# Patient Record
Sex: FEMALE | Race: WHITE | Employment: UNEMPLOYED | URBAN - METROPOLITAN AREA
[De-identification: names, ages, dates, MRNs, and addresses within clinical notes are randomized per-mention and may not be internally consistent; named-entity substitution may affect disease eponyms.]

---

## 2021-04-06 VITALS
BODY MASS INDEX: 24.58 KG/M2 | WEIGHT: 156.6 LBS | HEIGHT: 67 IN | SYSTOLIC BLOOD PRESSURE: 132 MMHG | DIASTOLIC BLOOD PRESSURE: 92 MMHG

## 2021-04-06 DIAGNOSIS — R19.5 FECAL OCCULT BLOOD TEST POSITIVE: ICD-10-CM

## 2021-04-06 PROBLEM — R51.9 HEADACHE: Status: ACTIVE | Noted: 2021-04-06

## 2021-04-06 PROBLEM — F32.A DEPRESSION: Status: ACTIVE | Noted: 2021-04-06

## 2021-04-06 PROBLEM — L03.211 CELLULITIS OF FACE: Status: ACTIVE | Noted: 2021-04-06

## 2021-04-06 PROBLEM — Z72.0 TOBACCO USE: Status: ACTIVE | Noted: 2021-04-06

## 2021-04-06 PROBLEM — I10 HTN (HYPERTENSION): Status: ACTIVE | Noted: 2021-04-06

## 2021-04-06 PROBLEM — F12.10 CANNABIS ABUSE: Status: ACTIVE | Noted: 2021-04-06

## 2021-04-06 PROBLEM — M19.90 OSTEOARTHRITIS: Status: ACTIVE | Noted: 2021-04-06

## 2021-04-06 PROBLEM — M75.52 BURSITIS OF SHOULDER, LEFT: Status: ACTIVE | Noted: 2021-04-06

## 2021-04-06 PROBLEM — F41.9 ANXIETY: Status: ACTIVE | Noted: 2021-04-06

## 2021-04-06 PROBLEM — E66.9 OBESITY: Status: ACTIVE | Noted: 2021-04-06

## 2021-04-06 PROBLEM — M06.9 RHEUMATOID ARTHRITIS (HCC): Status: ACTIVE | Noted: 2021-04-06

## 2021-04-06 PROBLEM — J40 BRONCHITIS: Chronic | Status: ACTIVE | Noted: 2021-04-06

## 2021-04-06 PROBLEM — M75.100 ROTATOR CUFF SYNDROME: Status: ACTIVE | Noted: 2021-04-06

## 2021-04-06 PROBLEM — R11.2 NAUSEA & VOMITING: Status: ACTIVE | Noted: 2021-04-06

## 2021-04-06 PROBLEM — R42 DIZZINESS AND GIDDINESS: Status: ACTIVE | Noted: 2021-04-06

## 2021-04-06 PROBLEM — K21.9 GERD (GASTROESOPHAGEAL REFLUX DISEASE): Status: ACTIVE | Noted: 2021-04-06

## 2021-04-06 PROBLEM — K21.9 ESOPHAGEAL REFLUX: Status: ACTIVE | Noted: 2021-04-06

## 2021-04-06 RX ORDER — LIDOCAINE HYDROCHLORIDE 20 MG/ML
15 SOLUTION OROPHARYNGEAL AS NEEDED
COMMUNITY

## 2021-04-06 RX ORDER — PROMETHAZINE HYDROCHLORIDE 25 MG/1
25 TABLET ORAL
COMMUNITY

## 2021-04-06 RX ORDER — FOLIC ACID 1 MG/1
1 TABLET ORAL DAILY
COMMUNITY

## 2021-04-06 RX ORDER — ATORVASTATIN CALCIUM 80 MG/1
80 TABLET, FILM COATED ORAL DAILY
COMMUNITY

## 2021-04-06 RX ORDER — FAMOTIDINE 20 MG/1
20 TABLET, FILM COATED ORAL 2 TIMES DAILY
COMMUNITY

## 2021-04-06 RX ORDER — ACYCLOVIR 200 MG/1
200 CAPSULE ORAL 2 TIMES DAILY
COMMUNITY

## 2021-04-06 RX ORDER — IBUPROFEN 600 MG/1
600 TABLET ORAL 2 TIMES DAILY
COMMUNITY

## 2021-04-06 RX ORDER — HYDROXYCHLOROQUINE SULFATE 200 MG/1
200 TABLET, FILM COATED ORAL 2 TIMES DAILY
COMMUNITY

## 2021-04-06 RX ORDER — ETODOLAC 400 MG/1
400 TABLET, FILM COATED ORAL 2 TIMES DAILY
COMMUNITY

## 2021-04-06 RX ORDER — MELATONIN
1000 DAILY
COMMUNITY

## 2021-04-06 RX ORDER — AMLODIPINE BESYLATE 2.5 MG/1
2.5 TABLET ORAL DAILY
COMMUNITY

## 2021-04-06 RX ORDER — DULOXETIN HYDROCHLORIDE 60 MG/1
60 CAPSULE, DELAYED RELEASE ORAL 2 TIMES DAILY
COMMUNITY

## 2021-04-06 RX ORDER — TRAZODONE HYDROCHLORIDE 50 MG/1
50 TABLET ORAL
COMMUNITY

## 2021-04-06 RX ORDER — SERTRALINE HYDROCHLORIDE 100 MG/1
100 TABLET, FILM COATED ORAL DAILY
COMMUNITY

## 2021-04-06 RX ORDER — METHOTREXATE 25 MG/ML
INJECTION INTRA-ARTERIAL; INTRAMUSCULAR; INTRATHECAL; INTRAVENOUS
COMMUNITY

## 2021-04-06 RX ORDER — OMEPRAZOLE 20 MG/1
CAPSULE, DELAYED RELEASE ORAL 2 TIMES DAILY
COMMUNITY

## 2021-04-06 RX ORDER — ALBUTEROL SULFATE 90 UG/1
AEROSOL, METERED RESPIRATORY (INHALATION)
COMMUNITY

## 2021-04-07 ENCOUNTER — OFFICE VISIT (OUTPATIENT)
Dept: GASTROENTEROLOGY | Age: 66
End: 2021-04-07
Payer: OTHER GOVERNMENT

## 2021-04-07 VITALS
SYSTOLIC BLOOD PRESSURE: 130 MMHG | HEART RATE: 103 BPM | HEIGHT: 67 IN | OXYGEN SATURATION: 97 % | BODY MASS INDEX: 26.06 KG/M2 | WEIGHT: 166 LBS | DIASTOLIC BLOOD PRESSURE: 80 MMHG | TEMPERATURE: 98.2 F | RESPIRATION RATE: 14 BRPM

## 2021-04-07 DIAGNOSIS — K62.5 GASTROINTESTINAL HEMORRHAGE ASSOCIATED WITH ANORECTAL SOURCE: ICD-10-CM

## 2021-04-07 DIAGNOSIS — R19.5 POSITIVE FIT (FECAL IMMUNOCHEMICAL TEST): Primary | ICD-10-CM

## 2021-04-07 DIAGNOSIS — Z86.010 HISTORY OF COLON POLYPS: ICD-10-CM

## 2021-04-07 DIAGNOSIS — R11.2 NON-INTRACTABLE VOMITING WITH NAUSEA, UNSPECIFIED VOMITING TYPE: ICD-10-CM

## 2021-04-07 DIAGNOSIS — K21.9 GASTROESOPHAGEAL REFLUX DISEASE, UNSPECIFIED WHETHER ESOPHAGITIS PRESENT: ICD-10-CM

## 2021-04-07 PROCEDURE — 99204 OFFICE O/P NEW MOD 45 MIN: CPT | Performed by: INTERNAL MEDICINE

## 2021-04-07 RX ORDER — POLYETHYLENE GLYCOL 3350 17 G/17G
POWDER, FOR SOLUTION ORAL
Qty: 510 G | Refills: 0 | Status: SHIPPED | OUTPATIENT
Start: 2021-04-07 | End: 2021-04-21

## 2021-04-07 NOTE — PROGRESS NOTES
1. Have you been to the ER, urgent care clinic since your last visit? Hospitalized since your last visit? NO    2. Have you seen or consulted any other health care providers outside of the [address] [place] [address] since your last visit? Include any pap smears or colon screening. NO. Chief Complaint   Patient presents with    Heme Positive Stool     Refered by Dept of [facility]    Vomiting     Visit Vitals  /80 (BP 1 Location: Left upper arm, BP Patient Position: Sitting, BP Cuff Size: Large adult)   Pulse (!) 103   Temp 98.2 °F (36.8 °C) (Skin)   Resp 14   Ht 5' 7\" (1.702 m)   Wt 75.3 kg (166 lb)   SpO2 97% Comment: room air   BMI 26.00 kg/m²     .

## 2021-04-07 NOTE — PROGRESS NOTES
EGD AND COLONOSCOPY ARE SCHEDULED FOR 4- AT 10:00 AM.  EGD AND COLONOSCOPY ARE APPROVED PER DEPT OF [facility]-REFERRAL # [id number] DATE 2-

## 2021-04-08 NOTE — H&P (VIEW-ONLY)
Geovani Glover is a 72 y.o. female who presents today for the following: Chief Complaint Patient presents with  Heme Positive Stool Refered by Dept of [facility]  GERD  Vomiting Allergies Allergen Reactions  Sulfa (Sulfonamide Antibiotics) Unknown (comments)  Sulfasalazine Unknown (comments) Current Outpatient Medications Medication Sig  polyethylene glycol (MIRALAX) 17 gram/dose powder Use as directed  Indications: emptying of the bowel  amLODIPine (NORVASC) 2.5 mg tablet Take 2.5 mg by mouth daily.  cholecalciferol (VITAMIN D3) (1000 Units /25 mcg) tablet Take 1,000 Units by mouth daily.  famotidine (PEPCID) 20 mg tablet Take 20 mg by mouth two (2) times a day.  ibuprofen (MOTRIN) 600 mg tablet Take 600 mg by mouth two (2) times a day. AS NEEDED  
 methotrexate, PF, 25 mg/mL injection every seven (7) days.  promethazine (PHENERGAN) 25 mg tablet Take 25 mg by mouth every six (6) hours as needed for Nausea.  sertraline (ZOLOFT) 100 mg tablet Take 100 mg by mouth daily. 1/2 TAB DAILY  traZODone (DESYREL) 50 mg tablet Take 50 mg by mouth nightly. 3 TAB Q HS  
 acyclovir (ZOVIRAX) 200 mg capsule Take 200 mg by mouth two (2) times a day. 2 CAP PO BID  atorvastatin (LIPITOR) 80 mg tablet Take 80 mg by mouth daily. 1/2 TAB QD  
 folic acid (FOLVITE) 1 mg tablet Take 1 mg by mouth daily. 3 TAB QD  
 albuterol (PROVENTIL HFA, VENTOLIN HFA, PROAIR HFA) 90 mcg/actuation inhaler Take  by inhalation every four (4) hours as needed for Wheezing.  etodolac (LODINE) 400 mg tablet Take 400 mg by mouth two (2) times a day. AS NEEDED  
 omeprazole (PRILOSEC) 20 mg capsule Take  by mouth two (2) times a day.  hydrOXYchloroQUINE (PLAQUENIL) 200 mg tablet Take 200 mg by mouth two (2) times a day.  DULoxetine (CYMBALTA) 60 mg capsule Take 60 mg by mouth two (2) times a day.   
 lidocaine (Lidocaine Viscous) 2 % solution Take 15 mL by mouth as needed for Pain. 5 TIME A DAY AS DIRECTED  
 adalimumab (HUMIRA) 40 mg/0.8 mL injection pen 40 mg by SubCUTAneous route Once every 2 weeks. No current facility-administered medications for this visit. Past Medical History:  
Diagnosis Date  Anxiety 4/6/2021  Bronchitis 4/6/2021  Bursitis of shoulder, left 4/6/2021  Cannabis abuse 4/6/2021  Cellulitis of face 4/6/2021  Depression 4/6/2021  Dizziness and giddiness 4/6/2021  Esophageal reflux 4/6/2021  Fatigue  Fecal occult blood test positive 4/6/2021  
 2018  GERD (gastroesophageal reflux disease) 4/6/2021  
 Headache 4/6/2021  
 HTN (hypertension) 4/6/2021  Obesity 4/6/2021  Osteoarthritis 4/6/2021  Rheumatoid arthritis ([facility] [address] 4/6/2021  Rotator cuff syndrome 4/6/2021  Tobacco use 4/6/2021 History reviewed. No pertinent surgical history. Family History Problem Relation Age of Onset  Heart Disease Father Social History Socioeconomic History  Marital status: UNKNOWN Spouse name: Not on file  Number of children: Not on file  Years of education: Not on file  Highest education level: Not on file Occupational History  Not on file Social Needs  Financial resource strain: Not on file  Food insecurity Worry: Not on file Inability: Not on file  Transportation needs Medical: Not on file Non-medical: Not on file Tobacco Use  Smoking status: Current Every Day Smoker  Smokeless tobacco: Never Used  Tobacco comment: smokes black and mild cigar Substance and Sexual Activity  Alcohol use: Yes Alcohol/week: 2.0 standard drinks Types: 2 Cans of beer per week  Drug use: Yes Types: Marijuana  Sexual activity: Not on file Lifestyle  Physical activity Days per week: Not on file Minutes per session: Not on file  Stress: Not on file Relationships  Social connections Talks on phone: Not on file   Gets together: Not on file Attends Buddhist service: Not on file Active member of club or organization: Not on file Attends meetings of clubs or organizations: Not on file Relationship status: Not on file  Intimate partner violence Fear of current or ex partner: Not on file Emotionally abused: Not on file Physically abused: Not on file Forced sexual activity: Not on file Other Topics Concern  Not on file Social History Narrative  Not on file HPI 
78-year-old female with history of hypertension, leukemia, gastroesophageal reflux disease, anxiety/depression, osteoarthritis, and rheumatoid arthritis who was referred from the [facility] secondary to FIT positive stools. Patient states 1-1/2 years ago she was found to have blood in her stools and scheduled for a colonoscopy. The test was not done at that time. She states because of flare with her leukemia. She states she has been treated for leukemi and that her levels are stable presently. She has been seeing blood in her stool which is bright red blood in the commode and on wiping at times. No abdominal pain. Her bowel movements are loose and multiple every morning. Poor appetite. Her weight has been stable recently. She has heartburn every day. She states she usually has nausea vomiting every few days. She does take omeprazole and famotidine. Her last colonoscopy was in 2000 and a couple polyps were removed according to patient. Review of Systems Constitutional: Negative. HENT: Negative. Negative for nosebleeds. Eyes: Negative. Respiratory: Negative. Cardiovascular: Negative. Gastrointestinal: Positive for blood in stool, diarrhea, heartburn, melena, nausea and vomiting. Negative for abdominal pain and constipation. Genitourinary: Negative. Musculoskeletal: Positive for joint pain. Skin: Negative. Neurological: Negative. Endo/Heme/Allergies: Negative.    
Psychiatric/Behavioral: Negative. All other systems reviewed and are negative. Visit Vitals /80 (BP 1 Location: Left upper arm, BP Patient Position: Sitting, BP Cuff Size: Large adult) Pulse (!) 103 Temp 98.2 °F (36.8 °C) (Skin) Resp 14 Ht 5' 7\" (1.702 m) Wt 75.3 kg (166 lb) SpO2 97% Comment: room air BMI 26.00 kg/m² Physical Exam 
Vitals signs and nursing note reviewed. Constitutional:   
   Appearance: Normal appearance. She is obese. HENT:  
   Head: Normocephalic and atraumatic. Nose: Nose normal.  
   Mouth/Throat:  
   Mouth: Mucous membranes are moist.  
   Pharynx: Oropharynx is clear. Eyes:  
   General: No scleral icterus. Conjunctiva/sclera: Conjunctivae normal.  
   Pupils: Pupils are equal, round, and reactive to light. Neck: Musculoskeletal: Normal range of motion and neck supple. Cardiovascular:  
   Rate and Rhythm: Normal rate and regular rhythm. Pulses: Normal pulses. Heart sounds: Normal heart sounds. Pulmonary:  
   Effort: Pulmonary effort is normal.  
   Breath sounds: Normal breath sounds. Abdominal:  
   General: Bowel sounds are normal. There is no distension. Palpations: Abdomen is soft. There is no mass. Tenderness: There is no abdominal tenderness. There is no right CVA tenderness, left CVA tenderness, guarding or rebound. Hernia: No hernia is present. Musculoskeletal: Normal range of motion. Skin: 
   General: Skin is warm and dry. Coloration: Skin is not jaundiced. Neurological:  
   General: No focal deficit present. Mental Status: She is alert and oriented to person, place, and time. Psychiatric:     
   Mood and Affect: Mood normal.     
   Behavior: Behavior normal.     
   Thought Content: Thought content normal.     
   Judgment: Judgment normal.  
 
  
 
 
1. Positive FIT (fecal immunochemical test) 
 
- COLONOSCOPY,DIAGNOSTIC; Future - polyethylene glycol (MIRALAX) 17 gram/dose powder; Use as directed  Indications: emptying of the bowel  Dispense: 510 g; Refill: 0 
- UPPER GI ENDOSCOPY,DIAGNOSIS; Future 2. Gastrointestinal hemorrhage associated with anorectal source 
 
- COLONOSCOPY,DIAGNOSTIC; Future - polyethylene glycol (MIRALAX) 17 gram/dose powder; Use as directed  Indications: emptying of the bowel  Dispense: 510 g; Refill: 0 
- UPPER GI ENDOSCOPY,DIAGNOSIS; Future 3. History of colon polyps 
 
- COLONOSCOPY,DIAGNOSTIC; Future - polyethylene glycol (MIRALAX) 17 gram/dose powder; Use as directed  Indications: emptying of the bowel  Dispense: 510 g; Refill: 0 
 
4. Non-intractable vomiting with nausea, unspecified vomiting type - UPPER GI ENDOSCOPY,DIAGNOSIS; Future 5. Gastroesophageal reflux disease, unspecified whether esophagitis present - UPPER GI ENDOSCOPY,DIAGNOSIS; Future

## 2021-04-08 NOTE — PROGRESS NOTES
Maricarmen Todd is a 72 y.o. female who presents today for the following:  Chief Complaint   Patient presents with    Heme Positive Stool     Refered by Dept of [facility]ER[facility]    Vomiting         Allergies   Allergen Reactions    Sulfa (Sulfonamide Antibiotics) Unknown (comments)    Sulfasalazine Unknown (comments)       Current Outpatient Medications   Medication Sig    polyethylene glycol (MIRALAX) 17 gram/dose powder Use as directed  Indications: emptying of the bowel    amLODIPine (NORVASC) 2.5 mg tablet Take 2.5 mg by mouth daily.  cholecalciferol (VITAMIN D3) (1000 Units /25 mcg) tablet Take 1,000 Units by mouth daily.  famotidine (PEPCID) 20 mg tablet Take 20 mg by mouth two (2) times a day.  ibuprofen (MOTRIN) 600 mg tablet Take 600 mg by mouth two (2) times a day. AS NEEDED    methotrexate, PF, 25 mg/mL injection every seven (7) days.  promethazine (PHENERGAN) 25 mg tablet Take 25 mg by mouth every six (6) hours as needed for Nausea.  sertraline (ZOLOFT) 100 mg tablet Take 100 mg by mouth daily. 1/2 TAB DAILY    traZODone (DESYREL) 50 mg tablet Take 50 mg by mouth nightly. 3 TAB Q HS    acyclovir (ZOVIRAX) 200 mg capsule Take 200 mg by mouth two (2) times a day. 2 CAP PO BID    atorvastatin (LIPITOR) 80 mg tablet Take 80 mg by mouth daily. 1/2 TAB QD    folic acid (FOLVITE) 1 mg tablet Take 1 mg by mouth daily. 3 TAB QD    albuterol (PROVENTIL HFA, VENTOLIN HFA, PROAIR HFA) 90 mcg/actuation inhaler Take  by inhalation every four (4) hours as needed for Wheezing.  etodolac (LODINE) 400 mg tablet Take 400 mg by mouth two (2) times a day. AS NEEDED    omeprazole (PRILOSEC) 20 mg capsule Take  by mouth two (2) times a day.  hydrOXYchloroQUINE (PLAQUENIL) 200 mg tablet Take 200 mg by mouth two (2) times a day.  DULoxetine (CYMBALTA) 60 mg capsule Take 60 mg by mouth two (2) times a day.     lidocaine (Lidocaine Viscous) 2 % solution Take 15 mL by mouth as needed for Pain. 5 TIME A DAY AS DIRECTED    adalimumab (HUMIRA) 40 mg/0.8 mL injection pen 40 mg by SubCUTAneous route Once every 2 weeks. No current facility-administered medications for this visit. Past Medical History:   Diagnosis Date    Anxiety 4/6/2021    Bronchitis 4/6/2021    Bursitis of shoulder, left 4/6/2021    Cannabis abuse 4/6/2021    Cellulitis of face 4/6/2021    Depression 4/6/2021    Dizziness and giddiness 4/6/2021    Esophageal reflux 4/6/2021    Fatigue     Fecal occult blood test positive 4/6/2021    2018    GERD (gastroesophageal reflux disease) 4/6/2021    Headache 4/6/2021    HTN (hypertension) 4/6/2021    Obesity 4/6/2021    Osteoarthritis 4/6/2021    Rheumatoid arthritis ([facility] [address] 4/6/2021    Rotator cuff syndrome 4/6/2021    Tobacco use 4/6/2021       History reviewed. No pertinent surgical history. Family History   Problem Relation Age of Onset    Heart Disease Father        Social History     Socioeconomic History    Marital status: UNKNOWN     Spouse name: Not on file    Number of children: Not on file    Years of education: Not on file    Highest education level: Not on file   Occupational History    Not on file   Social Needs    Financial resource strain: Not on file    Food insecurity     Worry: Not on file     Inability: Not on file    Transportation needs     Medical: Not on file     Non-medical: Not on file   Tobacco Use    Smoking status: Current Every Day Smoker    Smokeless tobacco: Never Used    Tobacco comment: smokes black and mild cigar   Substance and Sexual Activity    Alcohol use:  Yes     Alcohol/week: 2.0 standard drinks     Types: 2 Cans of beer per week    Drug use: Yes     Types: Marijuana    Sexual activity: Not on file   Lifestyle    Physical activity     Days per week: Not on file     Minutes per session: Not on file    Stress: Not on file   Relationships    Social connections     Talks on phone: Not on file     Gets together: Not on file     Attends [employer]ist service: Not on file     Active member of club or organization: Not on file     Attends meetings of clubs or organizations: Not on file     Relationship status: Not on file    Intimate partner violence     Fear of current or ex partner: Not on file     Emotionally abused: Not on file     Physically abused: Not on file     Forced sexual activity: Not on file   Other Topics Concern    Not on file   Social History Narrative    Not on file         HPI  61-year-old female with history of hypertension, leukemia, gastroesophageal reflux disease, anxiety/depression, osteoarthritis, and rheumatoid arthritis who was referred from the [facility] secondary to FIT positive stools. Patient states 1-1/2 years ago she was found to have blood in her stools and scheduled for a colonoscopy. The test was not done at that time. She states because of flare with her leukemia. She states she has been treated for leukemi and that her levels are stable presently. She has been seeing blood in her stool which is bright red blood in the commode and on wiping at times. No abdominal pain. Her bowel movements are loose and multiple every morning. Poor appetite. Her weight has been stable recently. She has heartburn every day. She states she usually has nausea vomiting every few days. She does take omeprazole and famotidine. Her last colonoscopy was in 2000 and a couple polyps were removed according to patient. Review of Systems   Constitutional: Negative. HENT: Negative. Negative for nosebleeds. Eyes: Negative. Respiratory: Negative. Cardiovascular: Negative. Gastrointestinal: Positive for blood in stool, diarrhea, heartburn, melena, nausea and vomiting. Negative for abdominal pain and constipation. Genitourinary: Negative. Musculoskeletal: Positive for joint pain. Skin: Negative. Neurological: Negative. Endo/Heme/Allergies: Negative.     Psychiatric/Behavioral: Negative. All other systems reviewed and are negative. Visit Vitals  /80 (BP 1 Location: Left upper arm, BP Patient Position: Sitting, BP Cuff Size: Large adult)   Pulse (!) 103   Temp 98.2 °F (36.8 °C) (Skin)   Resp 14   Ht 5' 7\" (1.702 m)   Wt 75.3 kg (166 lb)   SpO2 97% Comment: room air   BMI 26.00 kg/m²     Physical Exam  Vitals signs and nursing note reviewed. Constitutional:       Appearance: Normal appearance. She is obese. HENT:      Head: Normocephalic and atraumatic. Nose: Nose normal.      Mouth/Throat:      Mouth: Mucous membranes are moist.      Pharynx: Oropharynx is clear. Eyes:      General: No scleral icterus. Conjunctiva/sclera: Conjunctivae normal.      Pupils: Pupils are equal, round, and reactive to light. Neck:      Musculoskeletal: Normal range of motion and neck supple. Cardiovascular:      Rate and Rhythm: Normal rate and regular rhythm. Pulses: Normal pulses. Heart sounds: Normal heart sounds. Pulmonary:      Effort: Pulmonary effort is normal.      Breath sounds: Normal breath sounds. Abdominal:      General: Bowel sounds are normal. There is no distension. Palpations: Abdomen is soft. There is no mass. Tenderness: There is no abdominal tenderness. There is no right CVA tenderness, left CVA tenderness, guarding or rebound. Hernia: No hernia is present. Musculoskeletal: Normal range of motion. Skin:     General: Skin is warm and dry. Coloration: Skin is not jaundiced. Neurological:      General: No focal deficit present. Mental Status: She is alert and oriented to person, place, and time. Psychiatric:         Mood and Affect: Mood normal.         Behavior: Behavior normal.         Thought Content:  Thought content normal.         Judgment: Judgment normal.            1. Positive FIT (fecal immunochemical test)    - COLONOSCOPY,DIAGNOSTIC; Future  - polyethylene glycol (MIRALAX) 17 gram/dose powder; Use as directed  Indications: emptying of the bowel  Dispense: 510 g; Refill: 0  - UPPER GI ENDOSCOPY,DIAGNOSIS; Future    2. Gastrointestinal hemorrhage associated with anorectal source    - COLONOSCOPY,DIAGNOSTIC; Future  - polyethylene glycol (MIRALAX) 17 gram/dose powder; Use as directed  Indications: emptying of the bowel  Dispense: 510 g; Refill: 0  - UPPER GI ENDOSCOPY,DIAGNOSIS; Future    3. History of colon polyps    - COLONOSCOPY,DIAGNOSTIC; Future  - polyethylene glycol (MIRALAX) 17 gram/dose powder; Use as directed  Indications: emptying of the bowel  Dispense: 510 g; Refill: 0    4. Non-intractable vomiting with nausea, unspecified vomiting type    - UPPER GI ENDOSCOPY,DIAGNOSIS; Future    5.  Gastroesophageal reflux disease, unspecified whether esophagitis present    - UPPER GI ENDOSCOPY,DIAGNOSIS; Future

## 2021-04-15 ENCOUNTER — HOSPITAL ENCOUNTER (OUTPATIENT)
Dept: PREADMISSION TESTING | Age: 66
Discharge: HOME | End: 2021-04-15
Payer: OTHER GOVERNMENT

## 2021-04-15 LAB — SARS-COV-2, COV2: NORMAL

## 2021-04-15 PROCEDURE — U0003 INFECTIOUS AGENT DETECTION BY NUCLEIC ACID (DNA OR RNA); SEVERE ACUTE RESPIRATORY SYNDROME CORONAVIRUS 2 (SARS-COV-2) (CORONAVIRUS DISEASE [COVID-19]), AMPLIFIED PROBE TECHNIQUE, MAKING USE OF HIGH THROUGHPUT TECHNOLOGIES AS DESCRIBED BY CMS-2020-01-R: HCPCS

## 2021-04-16 LAB — SARS-COV-2, COV2NT: NOT DETECTED

## 2021-04-21 ENCOUNTER — HOSPITAL ENCOUNTER (OUTPATIENT)
Age: 66
Setting detail: OUTPATIENT SURGERY
Discharge: HOME | End: 2021-04-21
Attending: INTERNAL MEDICINE | Admitting: INTERNAL MEDICINE
Payer: OTHER GOVERNMENT

## 2021-04-21 ENCOUNTER — ANESTHESIA EVENT (OUTPATIENT)
Dept: ENDOSCOPY | Age: 66
End: 2021-04-21
Payer: OTHER GOVERNMENT

## 2021-04-21 ENCOUNTER — ANESTHESIA (OUTPATIENT)
Dept: ENDOSCOPY | Age: 66
End: 2021-04-21
Payer: OTHER GOVERNMENT

## 2021-04-21 VITALS
HEART RATE: 89 BPM | OXYGEN SATURATION: 97 % | TEMPERATURE: 97.7 F | DIASTOLIC BLOOD PRESSURE: 65 MMHG | SYSTOLIC BLOOD PRESSURE: 126 MMHG | BODY MASS INDEX: 26.82 KG/M2 | HEIGHT: 65 IN | WEIGHT: 161 LBS | RESPIRATION RATE: 20 BRPM

## 2021-04-21 PROCEDURE — 74011250636 HC RX REV CODE- 250/636: Performed by: NURSE ANESTHETIST, CERTIFIED REGISTERED

## 2021-04-21 PROCEDURE — 74011250636 HC RX REV CODE- 250/636: Performed by: INTERNAL MEDICINE

## 2021-04-21 PROCEDURE — 88305 TISSUE EXAM BY PATHOLOGIST: CPT

## 2021-04-21 PROCEDURE — 45380 COLONOSCOPY AND BIOPSY: CPT | Performed by: INTERNAL MEDICINE

## 2021-04-21 PROCEDURE — 74011000258 HC RX REV CODE- 258: Performed by: NURSE ANESTHETIST, CERTIFIED REGISTERED

## 2021-04-21 PROCEDURE — 43239 EGD BIOPSY SINGLE/MULTIPLE: CPT | Performed by: INTERNAL MEDICINE

## 2021-04-21 PROCEDURE — 77030021593 HC FCPS BIOP ENDOSC BSC -A: Performed by: INTERNAL MEDICINE

## 2021-04-21 PROCEDURE — 76040000007: Performed by: INTERNAL MEDICINE

## 2021-04-21 PROCEDURE — 2709999900 HC NON-CHARGEABLE SUPPLY: Performed by: INTERNAL MEDICINE

## 2021-04-21 PROCEDURE — 77030018831 HC SOL IRR H20 BAXT -A: Performed by: INTERNAL MEDICINE

## 2021-04-21 PROCEDURE — 74011000250 HC RX REV CODE- 250: Performed by: NURSE ANESTHETIST, CERTIFIED REGISTERED

## 2021-04-21 PROCEDURE — 76060000032 HC ANESTHESIA 0.5 TO 1 HR: Performed by: INTERNAL MEDICINE

## 2021-04-21 RX ORDER — SODIUM CHLORIDE 9 MG/ML
INJECTION, SOLUTION INTRAVENOUS
Status: DISCONTINUED | OUTPATIENT
Start: 2021-04-21 | End: 2021-04-21 | Stop reason: HOSPADM

## 2021-04-21 RX ORDER — SODIUM CHLORIDE 0.9 % (FLUSH) 0.9 %
5-40 SYRINGE (ML) INJECTION EVERY 8 HOURS
Status: DISCONTINUED | OUTPATIENT
Start: 2021-04-21 | End: 2021-04-21 | Stop reason: HOSPADM

## 2021-04-21 RX ORDER — SODIUM CHLORIDE 9 MG/ML
125 INJECTION, SOLUTION INTRAVENOUS CONTINUOUS
Status: DISCONTINUED | OUTPATIENT
Start: 2021-04-21 | End: 2021-04-21 | Stop reason: HOSPADM

## 2021-04-21 RX ORDER — SODIUM CHLORIDE 0.9 % (FLUSH) 0.9 %
5-40 SYRINGE (ML) INJECTION AS NEEDED
Status: DISCONTINUED | OUTPATIENT
Start: 2021-04-21 | End: 2021-04-21 | Stop reason: HOSPADM

## 2021-04-21 RX ORDER — KETAMINE HYDROCHLORIDE 10 MG/ML
INJECTION, SOLUTION INTRAMUSCULAR; INTRAVENOUS AS NEEDED
Status: DISCONTINUED | OUTPATIENT
Start: 2021-04-21 | End: 2021-04-21 | Stop reason: HOSPADM

## 2021-04-21 RX ORDER — PROPOFOL 10 MG/ML
INJECTION, EMULSION INTRAVENOUS AS NEEDED
Status: DISCONTINUED | OUTPATIENT
Start: 2021-04-21 | End: 2021-04-21 | Stop reason: HOSPADM

## 2021-04-21 RX ADMIN — PROPOFOL 100 MG: 10 INJECTION, EMULSION INTRAVENOUS at 11:55

## 2021-04-21 RX ADMIN — KETAMINE HYDROCHLORIDE 10 MG: 10 INJECTION, SOLUTION INTRAMUSCULAR; INTRAVENOUS at 11:37

## 2021-04-21 RX ADMIN — PROPOFOL 100 MG: 10 INJECTION, EMULSION INTRAVENOUS at 11:43

## 2021-04-21 RX ADMIN — PROPOFOL 100 MG: 10 INJECTION, EMULSION INTRAVENOUS at 11:51

## 2021-04-21 RX ADMIN — PROPOFOL 100 MG: 10 INJECTION, EMULSION INTRAVENOUS at 11:40

## 2021-04-21 RX ADMIN — PROPOFOL 100 MG: 10 INJECTION, EMULSION INTRAVENOUS at 11:37

## 2021-04-21 RX ADMIN — KETAMINE HYDROCHLORIDE 20 MG: 10 INJECTION, SOLUTION INTRAMUSCULAR; INTRAVENOUS at 11:30

## 2021-04-21 RX ADMIN — PROPOFOL 100 MG: 10 INJECTION, EMULSION INTRAVENOUS at 11:45

## 2021-04-21 RX ADMIN — SODIUM CHLORIDE 125 ML/HR: 9 INJECTION, SOLUTION INTRAVENOUS at 09:44

## 2021-04-21 RX ADMIN — SODIUM CHLORIDE: 9 INJECTION INTRAVENOUS at 11:20

## 2021-04-21 RX ADMIN — PROPOFOL 100 MG: 10 INJECTION, EMULSION INTRAVENOUS at 11:33

## 2021-04-21 RX ADMIN — PROPOFOL 100 MG: 10 INJECTION, EMULSION INTRAVENOUS at 11:30

## 2021-04-21 NOTE — DISCHARGE INSTRUCTIONS

## 2021-04-21 NOTE — OP NOTES
EGD Procedure Note        Patient: Rachel Saxena MRN: [id number]  SSN: [id number]    YOB: 1955  Age: 77 y.o. Sex: female        Date/Time:  4/21/2021 12:21 PM         IMPRESSION:       1. Generalized gastritis with erosions  2. Distal esophagitis (grade 2)  3. Decreased LES tone       RECOMMENDATIONS:    1. Check biopsy results. 2. Continue the omeprazole 20 mg daily. 3. Patient should stop or limit use of NSAIDs. Procedure: Esophagogastroduodenoscopy with cold biopsies    Indication: Positive FIT test, GI bleeding, nausea vomiting    Endoscopist:  Satinder Mckeon MD    Referring Provider:   Jorge Marmolejo MD    History: The history and physical exam were reviewed and updated. Endoscope: GIF H190 Olympus video endoscope    Extent of Exam: Second part of the duodenum    ASA: Grade 2    Anethesia/Sedation:  TIVA    Description of the procedure: The procedure was discussed with the patient including risks, benefits, alternatives including risks of iv sedation, bleeding, perforation and aspiration. A safety timeout was performed. The patient was placed in the left lateral decubitus position. A bite block was placed. The patient was using standard protocol. The patients vital signs were monitored at all times including heart rate/rhythm, blood pressure and oxygen saturation. The endoscope was then passed under direct visualization to the second part of the duodenum. The endoscope was then slowly withdrawn while visualizing the mucosa. In the stomach a retroflexion was performed and gastric fundus and cardia visualized. The patient was then transferred to recovery in stable condition. Findings:   Esophagus: The esophageal mucosa was inflamed throughout the distal esophagus consistent with a grade 2 esophagitis. .  Stomach: The gastric mucosa was flamed throughout the entire stomach with a large amount of erosions noted throughout the entire colon.   Multiple biopsies were taken in the gastric antrum. .   Duodenum: The duodenum mucosa was normal with no ulceration, mass, stricture and no evidence of villous atrophy. Therapies: None    Specimens:   ID Type Source Tests Collected by Time Destination   1 :  Preservative Stomach, Antrum  Salomón Ballard MD 4/21/2021 1132 Pathology   2 : rectal polyp Preservative Rectum  Salomón Ballard MD 4/21/2021 1205 Pathology              EBL: Minimal    Complications:   None; patient tolerated the procedure well.      Implants: None    Discharge disposition:  Out of the recovery area when discharge criteria met         Catalina Tadeo MD  April 21, 2021  12:21 PM

## 2021-04-21 NOTE — H&P
Patient: Mireya Humphries MRN: [id number]  SSN: [id number]    YOB: 1955  Age: 77 y.o. Sex: female      History and Physical    Mireya Humphries is a 77 y.o. female having Procedure(s):  EGD & COLONOSCOPY  (TIVA)  . Colten Rust Allergies: Allergies   Allergen Reactions    Sulfa (Sulfonamide Antibiotics) Unknown (comments)    Sulfasalazine Unknown (comments)        Chief Complaint: GI bleed, Gerd   History of Present Illness:    Past Medical History:   Diagnosis Date    Anxiety 4/6/2021    Bronchitis 4/6/2021    Bursitis of shoulder, left 4/6/2021    Cancer ([facility] [address]     Cannabis abuse 4/6/2021    Cellulitis of face 4/6/2021    Depression 4/6/2021    Dizziness and giddiness 4/6/2021    Esophageal reflux 4/6/2021    Fatigue     Fecal occult blood test positive 4/6/2021 2018    GERD (gastroesophageal reflux disease) 4/6/2021    Headache 4/6/2021    HTN (hypertension) 4/6/2021    Obesity 4/6/2021    Osteoarthritis 4/6/2021    Rheumatoid arthritis ([facility] [address] 4/6/2021    Rotator cuff syndrome 4/6/2021    Tobacco use 4/6/2021      Past Surgical History:   Procedure Laterality Date    HX APPENDECTOMY      HX CYST REMOVAL Left     HX HYSTERECTOMY        Family History   Problem Relation Age of Onset    Heart Disease Father     Psychiatric Disorder Mother       Social History     Tobacco Use    Smoking status: Current Every Day Smoker    Smokeless tobacco: Never Used    Tobacco comment: smokes black and mild cigar   Substance Use Topics    Alcohol use: Yes     Alcohol/week: 2.0 standard drinks     Types: 2 Cans of beer per week        Prior to Admission medications    Medication Sig Start Date End Date Taking? Authorizing Provider   polyethylene glycol (MIRALAX) 17 gram/dose powder Use as directed  Indications: emptying of the bowel 4/7/21  Yes Zaheer Burr MD   amLODIPine (NORVASC) 2.5 mg tablet Take 2.5 mg by mouth daily.    Yes Provider, Historical   cholecalciferol (VITAMIN D3) (1000 Units /25 mcg) tablet Take 1,000 Units by mouth daily. Yes Provider, Historical   famotidine (PEPCID) 20 mg tablet Take 20 mg by mouth two (2) times a day. Yes Provider, Historical   ibuprofen (MOTRIN) 600 mg tablet Take 600 mg by mouth two (2) times a day. AS NEEDED   Yes Provider, Historical   sertraline (ZOLOFT) 100 mg tablet Take 100 mg by mouth daily. 1/2 TAB DAILY   Yes Provider, Historical   traZODone (DESYREL) 50 mg tablet Take 50 mg by mouth nightly. 3 TAB Q HS   Yes Provider, Historical   acyclovir (ZOVIRAX) 200 mg capsule Take 200 mg by mouth two (2) times a day. 2 CAP PO BID   Yes Provider, Historical   atorvastatin (LIPITOR) 80 mg tablet Take 80 mg by mouth daily. 1/2 TAB QD   Yes Provider, Historical   folic acid (FOLVITE) 1 mg tablet Take 1 mg by mouth daily. 3 TAB QD   Yes Provider, Historical   omeprazole (PRILOSEC) 20 mg capsule Take  by mouth two (2) times a day. Yes Provider, Historical   hydrOXYchloroQUINE (PLAQUENIL) 200 mg tablet Take 200 mg by mouth two (2) times a day. Yes Provider, Historical   DULoxetine (CYMBALTA) 60 mg capsule Take 60 mg by mouth two (2) times a day. Yes Provider, Historical   methotrexate, PF, 25 mg/mL injection every seven (7) days. Provider, Historical   promethazine (PHENERGAN) 25 mg tablet Take 25 mg by mouth every six (6) hours as needed for Nausea. Provider, Historical   lidocaine (Lidocaine Viscous) 2 % solution Take 15 mL by mouth as needed for Pain. 5 TIME A DAY AS DIRECTED    Provider, Historical   albuterol (PROVENTIL HFA, VENTOLIN HFA, PROAIR HFA) 90 mcg/actuation inhaler Take  by inhalation every four (4) hours as needed for Wheezing. Provider, Historical   adalimumab (HUMIRA) 40 mg/0.8 mL injection pen 40 mg by SubCUTAneous route Once every 2 weeks. Provider, Historical   etodolac (LODINE) 400 mg tablet Take 400 mg by mouth two (2) times a day.  AS NEEDED    Provider, Historical        Visit Vitals  /79   Pulse 95   Temp 37 °C (98.6 °F)   Resp 20   Ht 5' 5\" (1.651 m)   Wt 73 kg (161 lb)   SpO2 97%   Breastfeeding No   BMI 26.79 kg/m²        Assessment and Plan:   Soha Merchant is a 77 y.o. female having Procedure(s):  EGD & COLONOSCOPY  (TIVA)  .     Preanesthesia Evaluation     Last edited 04/21/21 1051 by Pelon Carlin CRNA  Date of Service 04/21/21 1051  Status: Signed             Relevant Problems   No relevant active problems       Anesthetic History   No history of anesthetic complications  Other anesthesia complications          Review of Systems / Medical History  Patient summary reviewed, nursing notes reviewed and pertinent labs reviewed    Pulmonary  Within defined limits                 Neuro/Psych   Within defined limits           Cardiovascular  Within defined limits                     GI/Hepatic/Renal  Within defined limits              Endo/Other  Within defined limits           Other Findings              Physical Exam    Airway  Mallampati: II    Neck ROM: normal range of motion        Cardiovascular    Rhythm: regular  Rate: normal         Dental  No notable dental hx       Pulmonary  Breath sounds clear to auscultation               Abdominal  Abdominal exam normal       Other Findings            Anesthetic Plan    ASA: 3  Anesthesia type: total IV anesthesia            Anesthetic plan and risks discussed with: Patient               Preanesthesia evaluation performed by Pelon Carlin CRNA            Signed By: Barry Vidal CRNA     April 21, 2021

## 2021-04-21 NOTE — ANESTHESIA POSTPROCEDURE EVALUATION
Procedure(s):  EGD & COLONOSCOPY  (TIVA)  . .    total IV anesthesia    Anesthesia Post Evaluation      Multimodal analgesia: multimodal analgesia not used between 6 hours prior to anesthesia start to PACU discharge  Patient location during evaluation: PACU  Patient participation: complete - patient participated  Pain management: adequate  Anesthetic complications: no  Cardiovascular status: acceptable  Respiratory status: acceptable  Hydration status: acceptable  Post anesthesia nausea and vomiting:  none  Final Post Anesthesia Temperature Assessment:  Normothermia (36.0-37.5 degrees C)      INITIAL Post-op Vital signs:   Vitals Value Taken Time   /68 04/21/21 1225   Temp 36.5 °C (97.7 °F) 04/21/21 1225   Pulse 86 04/21/21 1225   Resp 18 04/21/21 1225   SpO2 100 % 04/21/21 1225

## 2021-04-21 NOTE — ANESTHESIA PREPROCEDURE EVALUATION
Relevant Problems   No relevant active problems       Anesthetic History   No history of anesthetic complications  Other anesthesia complications          Review of Systems / Medical History  Patient summary reviewed, nursing notes reviewed and pertinent labs reviewed    Pulmonary  Within defined limits                 Neuro/Psych   Within defined limits           Cardiovascular  Within defined limits                     GI/Hepatic/Renal  Within defined limits              Endo/Other  Within defined limits           Other Findings              Physical Exam    Airway  Mallampati: II    Neck ROM: normal range of motion        Cardiovascular    Rhythm: regular  Rate: normal         Dental  No notable dental hx       Pulmonary  Breath sounds clear to auscultation               Abdominal  Abdominal exam normal       Other Findings            Anesthetic Plan    ASA: 3  Anesthesia type: total IV anesthesia            Anesthetic plan and risks discussed with: Patient

## 2021-04-21 NOTE — OP NOTES
Colonoscopy Procedure Note      Patient: Acosta Zamorano MRN: [id number]  SSN: [id number]    YOB: 1955  Age: 77 y.o. Sex: female      Date of Procedure: 4/21/2021  Date/Time:  4/21/2021 12:14 PM       IMPRESSION:     1. Rectal polyp   2. Left-sided diverticulosis         RECOMMENDATIONS:     1) Check biopsy results. 2) Await pathology report. Call me in 2 weeks if you have not received any information from my office regarding your results. 3) Repeat colonoscopy in 2 to 3 years or as determined by the pathology report. INDICATION: Positive FIT test, GI bleeding     PROCEDURE PERFORMED: Colonoscopy with cold biopsies     DESCRIPTION OF PROCEDURE: An informed consent was obtained. The patient was placed in left lateral position. Perianal inspection and a digital rectal exam was performed. Video colonoscope was introduced into the rectum and advanced under direct vision up to the terminal ileum. With adequate insufflation and maneuvering of the withdrawing scope, the colonic mucosa was visualized carefully. Retroflexion was performed in the rectum to see the anorectum and also in the ascending colon to look behind the folds. Vital signs, pulse oximetry, single lead cardiac monitor were monitored throughout the procedure as the sedation was titrated to the desired effect ensuring patient comfort and safety. The patient tolerated the procedure very well and was transferred to the recovery area. Following is the summary of findings: We removed a small polyp measuring approximately 0.4 cm from the rectum by cold biopsies. Patient was noted to have left-sided diverticulosis which was moderate in number. The colon was somewhat stiffer as removed through that region. We did notice areas of increased erythema and submucosa edema throughout the right colon with a few submucosa areas of hemorrhage felt to be secondary to inflation of air in the right colon.      ENDOSCOPIST: Angelito Ann Starla De MD      ENDOSCOPE: Olympus video colonoscope     ASSISTANT:Circ-1: Iván Donaldson              Scrub Tech-1: Zbigniew Regalado     ANESTHESIA: TIVA      QUALITY OF PREPARATION: Fair      FINDINGS:   1. Rectal polyp  2.  Left-sided diverticulosis      EBL: Minimal   SPECIMENS:   ID Type Source Tests Collected by Time Destination   1 :  Preservative Stomach, Antrum  Beulah Licona MD 4/21/2021 1132 Pathology   2 : rectal polyp Preservative Rectum  Beulah Licona MD 4/21/2021 1205 Pathology             Blas Christine MD  April 21, 2021  12:14 PM

## 2021-04-21 NOTE — INTERVAL H&P NOTE
Update History & Physical 
 
The Patient's History and Physical of April 21, 2021 was reviewed with the patient and I examined the patient. There was no change. The surgical site was confirmed by the patient and me. Plan:  The risk, benefits, expected outcome, and alternative to the recommended procedure have been discussed with the patient. Patient understands and wants to proceed with the procedure.  
 
Electronically signed by Liz Dunne MD on 4/21/2021 at 9:39 AM

## 2021-05-04 NOTE — PROGRESS NOTES
Tell patient that the biopsy taken in her stomach showed gastritis/inflammation. No infection was noted. Polyp removed from his rectum was benign. He should have a repeat colonoscopy in 3 years.

## 2021-05-11 ENCOUNTER — TELEPHONE (OUTPATIENT)
Dept: GASTROENTEROLOGY | Age: 66
End: 2021-05-11

## 2021-08-03 PROBLEM — K21.9 ESOPHAGEAL REFLUX: Status: RESOLVED | Noted: 2021-04-06 | Resolved: 2021-08-03

## 2022-03-18 PROBLEM — E66.9 OBESITY: Status: ACTIVE | Noted: 2021-04-06

## 2022-03-18 PROBLEM — K21.9 GERD (GASTROESOPHAGEAL REFLUX DISEASE): Status: ACTIVE | Noted: 2021-04-06

## 2022-03-18 PROBLEM — R19.5 FECAL OCCULT BLOOD TEST POSITIVE: Status: ACTIVE | Noted: 2021-04-06

## 2022-03-18 PROBLEM — I10 HTN (HYPERTENSION): Status: ACTIVE | Noted: 2021-04-06

## 2022-03-18 PROBLEM — F32.A DEPRESSION: Status: ACTIVE | Noted: 2021-04-06

## 2022-03-18 PROBLEM — J40 BRONCHITIS: Status: ACTIVE | Noted: 2021-04-06

## 2022-03-19 PROBLEM — F41.9 ANXIETY: Status: ACTIVE | Noted: 2021-04-06

## 2022-03-19 PROBLEM — R42 DIZZINESS AND GIDDINESS: Status: ACTIVE | Noted: 2021-04-06

## 2022-03-19 PROBLEM — Z72.0 TOBACCO USE: Status: ACTIVE | Noted: 2021-04-06

## 2022-03-19 PROBLEM — F12.10 CANNABIS ABUSE: Status: ACTIVE | Noted: 2021-04-06

## 2022-03-19 PROBLEM — L03.211 CELLULITIS OF FACE: Status: ACTIVE | Noted: 2021-04-06

## 2022-03-19 PROBLEM — M06.9 RHEUMATOID ARTHRITIS (HCC): Status: ACTIVE | Noted: 2021-04-06

## 2022-03-19 PROBLEM — R51.9 HEADACHE: Status: ACTIVE | Noted: 2021-04-06

## 2022-03-19 PROBLEM — R11.2 NAUSEA & VOMITING: Status: ACTIVE | Noted: 2021-04-06

## 2022-03-19 PROBLEM — M75.100 ROTATOR CUFF SYNDROME: Status: ACTIVE | Noted: 2021-04-06

## 2022-03-19 PROBLEM — M19.90 OSTEOARTHRITIS: Status: ACTIVE | Noted: 2021-04-06

## 2022-03-19 PROBLEM — M75.52 BURSITIS OF SHOULDER, LEFT: Status: ACTIVE | Noted: 2021-04-06

## 2022-07-02 ENCOUNTER — HOSPITAL ENCOUNTER (EMERGENCY)
Age: 67
Discharge: LEFT BEFORE DISCHARGE | End: 2022-07-02

## 2022-07-02 VITALS
WEIGHT: 160.94 LBS | RESPIRATION RATE: 19 BRPM | OXYGEN SATURATION: 99 % | HEIGHT: 65 IN | DIASTOLIC BLOOD PRESSURE: 98 MMHG | TEMPERATURE: 98.8 F | HEART RATE: 98 BPM | BODY MASS INDEX: 26.81 KG/M2 | SYSTOLIC BLOOD PRESSURE: 107 MMHG

## 2022-07-03 NOTE — ED TRIAGE NOTES
Pt reports she had a tumor removed at the [facility]. Pt states she followed up with the surgeon and that told her the wound was infected. Pt states the surgeon was supposed to give her a \"wound kit\" but never did.

## 2023-05-24 RX ORDER — ACYCLOVIR 200 MG/1
200 CAPSULE ORAL 2 TIMES DAILY
COMMUNITY

## 2023-05-24 RX ORDER — ETODOLAC 400 MG/1
400 TABLET, FILM COATED ORAL 2 TIMES DAILY
COMMUNITY

## 2023-05-24 RX ORDER — DULOXETIN HYDROCHLORIDE 60 MG/1
60 CAPSULE, DELAYED RELEASE ORAL 2 TIMES DAILY
COMMUNITY

## 2023-05-24 RX ORDER — TRAZODONE HYDROCHLORIDE 50 MG/1
50 TABLET ORAL
COMMUNITY

## 2023-05-24 RX ORDER — OMEPRAZOLE 20 MG/1
CAPSULE, DELAYED RELEASE ORAL 2 TIMES DAILY
COMMUNITY

## 2023-05-24 RX ORDER — HYDROXYCHLOROQUINE SULFATE 200 MG/1
200 TABLET, FILM COATED ORAL 2 TIMES DAILY
COMMUNITY

## 2023-05-24 RX ORDER — SERTRALINE HYDROCHLORIDE 100 MG/1
100 TABLET, FILM COATED ORAL DAILY
COMMUNITY

## 2023-05-24 RX ORDER — AMLODIPINE BESYLATE 2.5 MG/1
2.5 TABLET ORAL DAILY
COMMUNITY

## 2023-05-24 RX ORDER — IBUPROFEN 600 MG/1
600 TABLET ORAL 2 TIMES DAILY
COMMUNITY

## 2023-05-24 RX ORDER — ALBUTEROL SULFATE 90 UG/1
AEROSOL, METERED RESPIRATORY (INHALATION) EVERY 4 HOURS PRN
COMMUNITY

## 2023-05-24 RX ORDER — FAMOTIDINE 20 MG/1
20 TABLET, FILM COATED ORAL 2 TIMES DAILY
COMMUNITY

## 2023-05-24 RX ORDER — FOLIC ACID 1 MG/1
1 TABLET ORAL DAILY
COMMUNITY

## 2023-05-24 RX ORDER — LIDOCAINE HYDROCHLORIDE 20 MG/ML
15 SOLUTION OROPHARYNGEAL PRN
COMMUNITY

## 2023-05-24 RX ORDER — ATORVASTATIN CALCIUM 80 MG/1
80 TABLET, FILM COATED ORAL DAILY
COMMUNITY

## 2023-05-24 RX ORDER — PROMETHAZINE HYDROCHLORIDE 25 MG/1
25 TABLET ORAL EVERY 6 HOURS PRN
COMMUNITY

## 2023-09-12 ENCOUNTER — HOSPITAL ENCOUNTER (OUTPATIENT)
Facility: HOSPITAL | Age: 68
Discharge: HOME | End: 2023-09-15
Payer: OTHER GOVERNMENT

## 2023-09-12 DIAGNOSIS — Z01.89 DIAGNOSTIC SKIN AND SENSITIZATION TESTS: ICD-10-CM

## 2023-09-12 DIAGNOSIS — Z01.89 ENCOUNTER FOR OTHER SPECIFIED SPECIAL EXAMINATIONS: ICD-10-CM

## 2023-09-12 LAB — CREAT SERPL-MCNC: 0.92 MG/DL (ref 0.55–1.02)

## 2023-09-12 PROCEDURE — 82565 ASSAY OF CREATININE: CPT

## 2023-09-12 PROCEDURE — A9579 GAD-BASE MR CONTRAST NOS,1ML: HCPCS | Performed by: INTERNAL MEDICINE

## 2023-09-12 PROCEDURE — 6360000004 HC RX CONTRAST MEDICATION: Performed by: INTERNAL MEDICINE

## 2023-09-12 PROCEDURE — 36415 COLL VENOUS BLD VENIPUNCTURE: CPT

## 2023-09-12 PROCEDURE — 74183 MRI ABD W/O CNTR FLWD CNTR: CPT

## 2023-09-12 RX ADMIN — GADOTERIDOL 15 ML: 279.3 INJECTION, SOLUTION INTRAVENOUS at 10:16

## 2024-07-30 ENCOUNTER — HOSPITAL ENCOUNTER (EMERGENCY)
Facility: HOSPITAL | Age: 69
Discharge: HOME | End: 2024-07-30
Attending: EMERGENCY MEDICINE
Payer: OTHER GOVERNMENT

## 2024-07-30 ENCOUNTER — APPOINTMENT (OUTPATIENT)
Facility: HOSPITAL | Age: 69
End: 2024-07-30
Attending: EMERGENCY MEDICINE
Payer: OTHER GOVERNMENT

## 2024-07-30 VITALS
SYSTOLIC BLOOD PRESSURE: 128 MMHG | HEIGHT: 66 IN | RESPIRATION RATE: 18 BRPM | DIASTOLIC BLOOD PRESSURE: 76 MMHG | HEART RATE: 92 BPM | OXYGEN SATURATION: 97 % | TEMPERATURE: 97.2 F | BODY MASS INDEX: 17.98 KG/M2 | WEIGHT: 111.9 LBS

## 2024-07-30 DIAGNOSIS — R07.89 CHEST WALL PAIN: ICD-10-CM

## 2024-07-30 DIAGNOSIS — Z51.5 HOSPICE CARE PATIENT: Primary | ICD-10-CM

## 2024-07-30 LAB
ALBUMIN SERPL-MCNC: 2.8 G/DL (ref 3.5–5)
ALBUMIN/GLOB SERPL: 0.5 (ref 1.1–2.2)
ALP SERPL-CCNC: 868 U/L (ref 45–117)
ALT SERPL-CCNC: 86 U/L (ref 12–78)
ANION GAP SERPL CALC-SCNC: 10 MMOL/L (ref 5–15)
AST SERPL W P-5'-P-CCNC: 120 U/L (ref 15–37)
BASOPHILS # BLD: 0 K/UL (ref 0–0.1)
BASOPHILS NFR BLD: 0 % (ref 0–1)
BILIRUB SERPL-MCNC: 1.4 MG/DL (ref 0.2–1)
BUN SERPL-MCNC: 9 MG/DL (ref 6–20)
BUN/CREAT SERPL: 10 (ref 12–20)
CA-I BLD-MCNC: 9.6 MG/DL (ref 8.5–10.1)
CHLORIDE SERPL-SCNC: 101 MMOL/L (ref 97–108)
CO2 SERPL-SCNC: 28 MMOL/L (ref 21–32)
CREAT SERPL-MCNC: 0.93 MG/DL (ref 0.55–1.02)
DIFFERENTIAL METHOD BLD: ABNORMAL
EOSINOPHIL # BLD: 0 K/UL (ref 0–0.4)
EOSINOPHIL NFR BLD: 0 % (ref 0–7)
ERYTHROCYTE [DISTWIDTH] IN BLOOD BY AUTOMATED COUNT: 14.2 % (ref 11.5–14.5)
GLOBULIN SER CALC-MCNC: 5.3 G/DL (ref 2–4)
GLUCOSE SERPL-MCNC: 138 MG/DL (ref 65–100)
HCT VFR BLD AUTO: 33.8 % (ref 35–47)
HGB BLD-MCNC: 11 G/DL (ref 11.5–16)
IMM GRANULOCYTES # BLD AUTO: 0 K/UL (ref 0–0.04)
IMM GRANULOCYTES NFR BLD AUTO: 0 % (ref 0–0.5)
LYMPHOCYTES # BLD: 0.7 K/UL (ref 0.8–3.5)
LYMPHOCYTES NFR BLD: 15 % (ref 12–49)
MCH RBC QN AUTO: 29.6 PG (ref 26–34)
MCHC RBC AUTO-ENTMCNC: 32.5 G/DL (ref 30–36.5)
MCV RBC AUTO: 91.1 FL (ref 80–99)
MONOCYTES # BLD: 0.5 K/UL (ref 0–1)
MONOCYTES NFR BLD: 11 % (ref 5–13)
NEUTS SEG # BLD: 3.3 K/UL (ref 1.8–8)
NEUTS SEG NFR BLD: 74 % (ref 32–75)
NRBC # BLD: 0 K/UL (ref 0–0.01)
NRBC BLD-RTO: 0 PER 100 WBC
PLATELET # BLD AUTO: 153 K/UL (ref 150–400)
PMV BLD AUTO: 10.5 FL (ref 8.9–12.9)
POTASSIUM SERPL-SCNC: 3.4 MMOL/L (ref 3.5–5.1)
PROT SERPL-MCNC: 8.1 G/DL (ref 6.4–8.2)
RBC # BLD AUTO: 3.71 M/UL (ref 3.8–5.2)
RBC MORPH BLD: ABNORMAL
SODIUM SERPL-SCNC: 139 MMOL/L (ref 136–145)
TROPONIN I SERPL HS-MCNC: 7 NG/L (ref 0–51)
WBC # BLD AUTO: 4.5 K/UL (ref 3.6–11)

## 2024-07-30 PROCEDURE — 96375 TX/PRO/DX INJ NEW DRUG ADDON: CPT

## 2024-07-30 PROCEDURE — 93005 ELECTROCARDIOGRAM TRACING: CPT | Performed by: EMERGENCY MEDICINE

## 2024-07-30 PROCEDURE — 85025 COMPLETE CBC W/AUTO DIFF WBC: CPT

## 2024-07-30 PROCEDURE — 99285 EMERGENCY DEPT VISIT HI MDM: CPT

## 2024-07-30 PROCEDURE — 96374 THER/PROPH/DIAG INJ IV PUSH: CPT

## 2024-07-30 PROCEDURE — 84484 ASSAY OF TROPONIN QUANT: CPT

## 2024-07-30 PROCEDURE — 36415 COLL VENOUS BLD VENIPUNCTURE: CPT

## 2024-07-30 PROCEDURE — 6360000002 HC RX W HCPCS: Performed by: EMERGENCY MEDICINE

## 2024-07-30 PROCEDURE — 80053 COMPREHEN METABOLIC PANEL: CPT

## 2024-07-30 PROCEDURE — 71045 X-RAY EXAM CHEST 1 VIEW: CPT

## 2024-07-30 RX ORDER — MEGESTROL ACETATE 40 MG/1
40 TABLET ORAL DAILY
Qty: 30 TABLET | Refills: 3 | Status: SHIPPED | OUTPATIENT
Start: 2024-07-30

## 2024-07-30 RX ORDER — CYCLOBENZAPRINE HCL 5 MG
5 TABLET ORAL 3 TIMES DAILY PRN
Qty: 20 TABLET | Refills: 0 | Status: SHIPPED | OUTPATIENT
Start: 2024-07-30 | End: 2024-08-09

## 2024-07-30 RX ORDER — ONDANSETRON 2 MG/ML
4 INJECTION INTRAMUSCULAR; INTRAVENOUS ONCE
Status: COMPLETED | OUTPATIENT
Start: 2024-07-30 | End: 2024-07-30

## 2024-07-30 RX ORDER — MORPHINE SULFATE 4 MG/ML
4 INJECTION, SOLUTION INTRAMUSCULAR; INTRAVENOUS
Status: DISCONTINUED | OUTPATIENT
Start: 2024-07-30 | End: 2024-07-30 | Stop reason: HOSPADM

## 2024-07-30 RX ORDER — LIDOCAINE 50 MG/G
1 PATCH TOPICAL DAILY
Qty: 10 PATCH | Refills: 0 | Status: SHIPPED | OUTPATIENT
Start: 2024-07-30 | End: 2024-08-09

## 2024-07-30 RX ADMIN — HYDROMORPHONE HYDROCHLORIDE 0.5 MG: 1 INJECTION, SOLUTION INTRAMUSCULAR; INTRAVENOUS; SUBCUTANEOUS at 13:37

## 2024-07-30 RX ADMIN — ONDANSETRON 4 MG: 2 INJECTION INTRAMUSCULAR; INTRAVENOUS at 13:38

## 2024-07-30 NOTE — ED PROVIDER NOTES
approximately softball sizeLarge mass just left of center chest wall   Skin:     General: Skin is warm and dry.      Capillary Refill: Capillary refill takes less than 2 seconds.   Neurological:      General: No focal deficit present.      Mental Status: She is alert and oriented to person, place, and time.   Psychiatric:         Mood and Affect: Mood normal.           SCREENINGS                No data recorded    LAB, EKG AND DIAGNOSTIC RESULTS   Labs:      Contains abnormal data CBC with Auto Differential (Final result)   Component (Lab Inquiry)Collection Time Result Time WBC RBC HGB HCT MCV   07/30/24 13:11:00 07/30/24 13:32:37 4.5 3.71 Low  11.0 Low  33.8 Low  91.1       Collection Time Result Time MCH MCHC RDW PLT MPV   07/30/24 13:11:00 07/30/24 13:32:37 29.6 32.5 14.2 153 10.5       Collection Time Result Time NRBC NRBC NEUTRO PCT LYMPHO PCT MONO PCT   07/30/24 13:11:00 07/30/24 13:32:37 0.0 0.00 74 15 11       Collection Time Result Time EOS PCT BASOS PCT Immature Granulocytes % NEUTRO ABS LYMPHS ABS   07/30/24 13:11:00 07/30/24 13:32:37 0 0 0 3.3 0.7 Low        Collection Time Result Time MONOS ABS EOS ABS BASOS ABS absimmgran DIFF TYPE   07/30/24 13:11:00 07/30/24 13:32:37 0.5 0.0 0.0 0.0 Smear Scanned       Collection Time Result Time RBC Comment   07/30/24 13:11:00 07/30/24 13:32:37 Normocytic, Normochromic         Final result                           Contains abnormal data Comprehensive Metabolic Panel w/ Reflex to MG (Final result)   Component (Lab Inquiry)Collection Time Result Time NA K CL CO2 ANIONGAP   07/30/24 13:11:00 07/30/24 13:46:34 139 3.4 Low  101 28 10       Collection Time Result Time GLUCOSE BUN CREATININE BCR GFR   07/30/24 13:11:00 07/30/24 13:46:34 138 High  9 0.93 10 Low  67     Pediatric calculato...       Collection Time Result Time CALCIUM BILITOT AST ALT Alk Phosphatase   07/30/24 13:11:00 07/30/24 13:46:34 9.6 1.4 High  120 High  86 High  868 High        Collection Time Result

## 2024-07-30 NOTE — ED TRIAGE NOTES
[facility] care, reports stopped chemo treatments in April 2024, reports dull achy chest pain at tumor site with some nausea that started yesterday but worse today, uses Morphine at home for comfort last dose yesterday afternoon, has hx of cancer

## 2024-07-30 NOTE — ED NOTES
Patient medicated per order for pain. Patient refused morphine at this time reports she can take  morphine at home and does not want it here

## 2024-07-31 LAB
EKG ATRIAL RATE: 101 BPM
EKG DIAGNOSIS: NORMAL
EKG P AXIS: 45 DEGREES
EKG P-R INTERVAL: 110 MS
EKG Q-T INTERVAL: 349 MS
EKG QRS DURATION: 87 MS
EKG QTC CALCULATION (BAZETT): 451 MS
EKG R AXIS: 41 DEGREES
EKG T AXIS: 2 DEGREES
EKG VENTRICULAR RATE: 100 BPM

## 2024-08-24 ENCOUNTER — APPOINTMENT (OUTPATIENT)
Facility: HOSPITAL | Age: 69
End: 2024-08-24
Attending: EMERGENCY MEDICINE
Payer: OTHER GOVERNMENT

## 2024-08-24 ENCOUNTER — HOSPITAL ENCOUNTER (EMERGENCY)
Facility: HOSPITAL | Age: 69
Discharge: HOME | End: 2024-08-24
Attending: EMERGENCY MEDICINE
Payer: OTHER GOVERNMENT

## 2024-08-24 VITALS
SYSTOLIC BLOOD PRESSURE: 105 MMHG | RESPIRATION RATE: 16 BRPM | DIASTOLIC BLOOD PRESSURE: 72 MMHG | HEIGHT: 60 IN | HEART RATE: 89 BPM | TEMPERATURE: 98 F | BODY MASS INDEX: 23.75 KG/M2 | OXYGEN SATURATION: 100 % | WEIGHT: 121 LBS

## 2024-08-24 DIAGNOSIS — S42.292A HUMERAL HEAD FRACTURE, LEFT, CLOSED, INITIAL ENCOUNTER: Primary | ICD-10-CM

## 2024-08-24 LAB
ALBUMIN SERPL-MCNC: 2 G/DL (ref 3.5–5)
ALBUMIN/GLOB SERPL: 0.4 (ref 1.1–2.2)
ALP SERPL-CCNC: 624 U/L (ref 45–117)
ALT SERPL-CCNC: 56 U/L (ref 12–78)
ANION GAP SERPL CALC-SCNC: 11 MMOL/L (ref 5–15)
AST SERPL W P-5'-P-CCNC: 121 U/L (ref 15–37)
BILIRUB SERPL-MCNC: 17.2 MG/DL (ref 0.2–1)
BUN SERPL-MCNC: 12 MG/DL (ref 6–20)
BUN/CREAT SERPL: 14 (ref 12–20)
CA-I BLD-MCNC: 9.4 MG/DL (ref 8.5–10.1)
CHLORIDE SERPL-SCNC: 96 MMOL/L (ref 97–108)
CO2 SERPL-SCNC: 23 MMOL/L (ref 21–32)
CREAT SERPL-MCNC: 0.83 MG/DL (ref 0.55–1.02)
GLOBULIN SER CALC-MCNC: 5 G/DL (ref 2–4)
GLUCOSE SERPL-MCNC: 99 MG/DL (ref 65–100)
POTASSIUM SERPL-SCNC: 3.8 MMOL/L (ref 3.5–5.1)
PROT SERPL-MCNC: 7 G/DL (ref 6.4–8.2)
SODIUM SERPL-SCNC: 130 MMOL/L (ref 136–145)

## 2024-08-24 PROCEDURE — 36415 COLL VENOUS BLD VENIPUNCTURE: CPT

## 2024-08-24 PROCEDURE — 96375 TX/PRO/DX INJ NEW DRUG ADDON: CPT

## 2024-08-24 PROCEDURE — 96376 TX/PRO/DX INJ SAME DRUG ADON: CPT

## 2024-08-24 PROCEDURE — 96374 THER/PROPH/DIAG INJ IV PUSH: CPT

## 2024-08-24 PROCEDURE — 73030 X-RAY EXAM OF SHOULDER: CPT

## 2024-08-24 PROCEDURE — 6360000002 HC RX W HCPCS: Performed by: EMERGENCY MEDICINE

## 2024-08-24 PROCEDURE — 80053 COMPREHEN METABOLIC PANEL: CPT

## 2024-08-24 PROCEDURE — 99284 EMERGENCY DEPT VISIT MOD MDM: CPT

## 2024-08-24 RX ORDER — OXYCODONE AND ACETAMINOPHEN 5; 325 MG/1; MG/1
1 TABLET ORAL EVERY 6 HOURS PRN
Qty: 20 TABLET | Refills: 0 | Status: ON HOLD | OUTPATIENT
Start: 2024-08-24 | End: 2024-08-27 | Stop reason: HOSPADM

## 2024-08-24 RX ORDER — ONDANSETRON 2 MG/ML
4 INJECTION INTRAMUSCULAR; INTRAVENOUS EVERY 6 HOURS PRN
Status: DISCONTINUED | OUTPATIENT
Start: 2024-08-24 | End: 2024-08-24 | Stop reason: HOSPADM

## 2024-08-24 RX ADMIN — HYDROMORPHONE HYDROCHLORIDE 0.5 MG: 1 INJECTION, SOLUTION INTRAMUSCULAR; INTRAVENOUS; SUBCUTANEOUS at 16:51

## 2024-08-24 RX ADMIN — HYDROMORPHONE HYDROCHLORIDE 0.5 MG: 1 INJECTION, SOLUTION INTRAMUSCULAR; INTRAVENOUS; SUBCUTANEOUS at 16:21

## 2024-08-24 RX ADMIN — ONDANSETRON 4 MG: 2 INJECTION INTRAMUSCULAR; INTRAVENOUS at 16:21

## 2024-08-24 ASSESSMENT — PAIN - FUNCTIONAL ASSESSMENT: PAIN_FUNCTIONAL_ASSESSMENT: 0-10

## 2024-08-24 ASSESSMENT — ENCOUNTER SYMPTOMS
ALLERGIC/IMMUNOLOGIC NEGATIVE: 1
GASTROINTESTINAL NEGATIVE: 1
EYES NEGATIVE: 1
RESPIRATORY NEGATIVE: 1

## 2024-08-24 ASSESSMENT — PAIN DESCRIPTION - LOCATION: LOCATION: SHOULDER

## 2024-08-24 ASSESSMENT — PAIN SCALES - GENERAL: PAINLEVEL_OUTOF10: 10

## 2024-08-24 ASSESSMENT — PAIN DESCRIPTION - ORIENTATION: ORIENTATION: LEFT

## 2024-08-24 ASSESSMENT — PAIN DESCRIPTION - PAIN TYPE: TYPE: ACUTE PAIN

## 2024-08-24 NOTE — ED TRIAGE NOTES
Pt  states she turned over in bed and left shoulder \"popped\"cancer/o left shoulder pain. Pt states she has bone cancer and is on hospice

## 2024-08-24 NOTE — ED NOTES
Spoke with Deana at [facility] ([phone]) who advises she is going to call the on-call hospice nurse who will call us back regarding stipulations of patient's ability to be admitted under hospice care.    Post-Care Instructions: I reviewed with the patient in detail post-care instructions. Patient is to wear sunprotection, and avoid picking at any of the treated lesions. Pt may apply Vaseline to crusted or scabbing areas. Render Note In Bullet Format When Appropriate: No Number Of Freeze-Thaw Cycles: 2 freeze-thaw cycles Show Aperture Variable?: Yes Application Tool (Optional): Liquid Nitrogen Sprayer Detail Level: Detailed Duration Of Freeze Thaw-Cycle (Seconds): 0 Consent: The patient's consent was obtained including but not limited to risks of crusting, scabbing, blistering, scarring, darker or lighter pigmentary change, recurrence, incomplete removal and infection.

## 2024-08-24 NOTE — ED PROVIDER NOTES
[facility] EMERGENCY DEPT  EMERGENCY DEPARTMENT ENCOUNTER      Pt Name: Madyson Rhodes  MRN: [id number]  Birthdate 1955  Date of evaluation: 8/24/2024  Provider: Jono Fuentes MD  4:10 PM    CHIEF COMPLAINT       Chief Complaint   Patient presents with    Shoulder Pain         HISTORY OF PRESENT ILLNESS    Madyson Rhodes is a 69 y.o. female who presents to the emergency department with a history of metastatic cancer and is under hospice care. She turned in bed and felt left shoulder pop and is in severe pain.       HPI    Nursing Notes were reviewed.    REVIEW OF SYSTEMS       Review of Systems   Constitutional:  Positive for fatigue.   HENT: Negative.     Eyes: Negative.    Respiratory: Negative.     Cardiovascular: Negative.    Gastrointestinal: Negative.    Endocrine: Negative.    Genitourinary: Negative.    Musculoskeletal: Negative.         Left shoulder pain.    Allergic/Immunologic: Negative.    Neurological: Negative.    Hematological: Negative.    Psychiatric/Behavioral: Negative.         Except as noted above the remainder of the review of systems was reviewed and negative.       PAST MEDICAL HISTORY     Past Medical History:   Diagnosis Date    Anxiety 4/6/2021    Bronchitis 4/6/2021    Bursitis of shoulder, left 4/6/2021    Cancer (HCC)     Cannabis abuse 4/6/2021    Cellulitis of face 4/6/2021    Depression 4/6/2021    Dizziness and giddiness 4/6/2021    Esophageal reflux 4/6/2021    Fatigue     Fecal occult blood test positive 4/6/2021    2018    GERD (gastroesophageal reflux disease) 4/6/2021    Headache 4/6/2021    HTN (hypertension) 4/6/2021    Obesity 4/6/2021    Osteoarthritis 4/6/2021    Rheumatoid arthritis (HCC) 4/6/2021    Rotator cuff syndrome 4/6/2021    Tobacco use 4/6/2021         SURGICAL HISTORY       Past Surgical History:   Procedure Laterality Date    APPENDECTOMY      COLONOSCOPY N/A 4/21/2021    . performed by Do Stanley MD at [facility] ENDOSCOPY    CYST REMOVAL Left      DEPARTMENT COURSE and DIFFERENTIAL DIAGNOSIS/MDM:   Vitals:    Vitals:    08/24/24 1554 08/24/24 1559   BP:  105/72   Pulse: 89    Resp: 16    Temp: 98 °F (36.7 °C)    SpO2: 100%    Weight: 54.9 kg (121 lb)    Height: 1.524 m (5')            Medical Decision Making  Amount and/or Complexity of Data Reviewed  Labs: ordered.  Radiology: ordered.    Risk  Prescription drug management.            REASSESSMENT          CRITICAL CARE TIME   Total Critical Care time was 0 minutes, excluding separately reportable procedures.  There was a high probability of clinically significant/life threatening deterioration in the patient's condition which required my urgent intervention.      CONSULTS:  None    PROCEDURES:  Unless otherwise noted below, none     Procedures        FINAL IMPRESSION    No diagnosis found.      DISPOSITION/PLAN   DISPOSITION    Condition at Disposition: Data Unavailable      PATIENT REFERRED TO:  No follow-up provider specified.    DISCHARGE MEDICATIONS:  New Prescriptions    No medications on file     Controlled Substances Monitoring:          No data to display                (Please note that portions of this note were completed with a voice recognition program.  Efforts were made to edit the dictations but occasionally words are mis-transcribed.)    Brain MD Alfredo (electronically signed)  Attending Emergency Physician           John Fuentes MD  08/24/24 1440

## 2024-08-26 ENCOUNTER — APPOINTMENT (OUTPATIENT)
Facility: HOSPITAL | Age: 69
End: 2024-08-26
Payer: OTHER GOVERNMENT

## 2024-08-26 ENCOUNTER — APPOINTMENT (OUTPATIENT)
Facility: HOSPITAL | Age: 69
End: 2024-08-26
Attending: EMERGENCY MEDICINE
Payer: OTHER GOVERNMENT

## 2024-08-26 ENCOUNTER — HOSPITAL ENCOUNTER (OUTPATIENT)
Facility: HOSPITAL | Age: 69
Setting detail: OBSERVATION
Discharge: HOSPICE | End: 2024-08-27
Attending: EMERGENCY MEDICINE | Admitting: INTERNAL MEDICINE
Payer: OTHER GOVERNMENT

## 2024-08-26 DIAGNOSIS — M84.48XA PATHOLOGICAL FRACTURE OF LUMBAR VERTEBRA, INITIAL ENCOUNTER: ICD-10-CM

## 2024-08-26 DIAGNOSIS — C79.51 MALIGNANT NEOPLASM METASTATIC TO BONE (HCC): Primary | ICD-10-CM

## 2024-08-26 DIAGNOSIS — M84.522A PATHOLOGICAL FRACTURE OF LEFT HUMERUS DUE TO NEOPLASTIC DISEASE, INITIAL ENCOUNTER: ICD-10-CM

## 2024-08-26 PROCEDURE — G0378 HOSPITAL OBSERVATION PER HR: HCPCS

## 2024-08-26 PROCEDURE — 73070 X-RAY EXAM OF ELBOW: CPT

## 2024-08-26 PROCEDURE — 72131 CT LUMBAR SPINE W/O DYE: CPT

## 2024-08-26 PROCEDURE — 73060 X-RAY EXAM OF HUMERUS: CPT

## 2024-08-26 PROCEDURE — 99285 EMERGENCY DEPT VISIT HI MDM: CPT

## 2024-08-26 ASSESSMENT — PAIN DESCRIPTION - LOCATION: LOCATION: BACK

## 2024-08-26 ASSESSMENT — VISUAL ACUITY: OU: 1

## 2024-08-26 ASSESSMENT — PAIN SCALES - GENERAL
PAINLEVEL_OUTOF10: 6
PAINLEVEL_OUTOF10: 0

## 2024-08-26 ASSESSMENT — PAIN - FUNCTIONAL ASSESSMENT: PAIN_FUNCTIONAL_ASSESSMENT: 0-10

## 2024-08-26 NOTE — ED PROVIDER NOTES
[facility] EMERGENCY DEPT  EMERGENCY DEPARTMENT HISTORY AND PHYSICAL EXAM      Date: 8/26/2024  Patient Name: Madyson Rhodes  MRN: [id number]  YOB: 1955  Date of evaluation: 8/26/2024  Provider: Lisa Lange MD   Note Started: 6:40 PM EDT 8/26/24    HISTORY OF PRESENT ILLNESS     Chief Complaint   Patient presents with    Fall       History Provided By: Patient    HPI: Madyson Rhodes is a 69 y.o. female     PAST MEDICAL HISTORY   Past Medical History:  Past Medical History:   Diagnosis Date    Anxiety 4/6/2021    Bronchitis 4/6/2021    Bursitis of shoulder, left 4/6/2021    Cancer (HCC)     Cannabis abuse 4/6/2021    Cellulitis of face 4/6/2021    Depression 4/6/2021    Dizziness and giddiness 4/6/2021    Esophageal reflux 4/6/2021    Fatigue     Fecal occult blood test positive 4/6/2021 2018    GERD (gastroesophageal reflux disease) 4/6/2021    Headache 4/6/2021    HTN (hypertension) 4/6/2021    Obesity 4/6/2021    Osteoarthritis 4/6/2021    Rheumatoid arthritis (HCC) 4/6/2021    Rotator cuff syndrome 4/6/2021    Tobacco use 4/6/2021       Past Surgical History:  Past Surgical History:   Procedure Laterality Date    APPENDECTOMY      COLONOSCOPY N/A 4/21/2021    . performed by Do Stanley MD at [facility] ENDOSCOPY    CYST REMOVAL Left     HYSTERECTOMY (CERVIX STATUS UNKNOWN)         Family History:  Family History   Problem Relation Age of Onset    Psychiatric Disorder Mother     Heart Disease Father        Social History:  Social History     Tobacco Use    Smoking status: Every Day    Smokeless tobacco: Never    Tobacco comments:     Quit smoking: smokes black and mild cigar   Substance Use Topics    Alcohol use: Yes     Alcohol/week: 2.0 standard drinks of alcohol    Drug use: Yes     Frequency: 2.0 times per week     Types: Marijuana (Weed)       Allergies:  Allergies   Allergen Reactions    Sulfa Antibiotics      Other reaction(s): Unknown (comments)    Sulfasalazine      Other reaction(s):  minutes    FINAL IMPRESSION   No diagnosis found.      DISPOSITION/PLAN   DISPOSITION    Condition at Disposition: Data Unavailable    Admit Note: Pt is being admitted by Dr. Blandon/Marco. The results of their tests and reason(s) for their admission have been discussed with pt and/or available family. They convey agreement and understanding for the need to be admitted and for the admission diagnosis.     PATIENT REFERRED TO:  No follow-up provider specified.      DISCHARGE MEDICATIONS:     Medication List        ASK your doctor about these medications      acyclovir 200 MG capsule  Commonly known as: ZOVIRAX     adalimumab 40 MG/0.8ML injection  Commonly known as: HUMIRA     albuterol sulfate  (90 Base) MCG/ACT inhaler  Commonly known as: PROVENTIL;VENTOLIN;PROAIR     amLODIPine 2.5 MG tablet  Commonly known as: NORVASC     atorvastatin 80 MG tablet  Commonly known as: LIPITOR     DULoxetine 60 MG extended release capsule  Commonly known as: CYMBALTA     etodolac 400 MG tablet  Commonly known as: LODINE     famotidine 20 MG tablet  Commonly known as: PEPCID     folic acid 1 MG tablet  Commonly known as: FOLVITE     hydroxychloroquine 200 MG tablet  Commonly known as: PLAQUENIL     ibuprofen 600 MG tablet  Commonly known as: ADVIL;MOTRIN     lidocaine viscous hcl 2 % Soln solution  Commonly known as: XYLOCAINE     megestrol 40 MG tablet  Commonly known as: MEGACE  Take 1 tablet by mouth daily     omeprazole 20 MG delayed release capsule  Commonly known as: PRILOSEC     oxyCODONE-acetaminophen 5-325 MG per tablet  Commonly known as: Percocet  Take 1 tablet by mouth every 6 hours as needed for Pain for up to 5 days. Intended supply: 5 days. Take lowest dose possible to manage pain Max Daily Amount: 4 tablets     promethazine 25 MG tablet  Commonly known as: PHENERGAN     sertraline 100 MG tablet  Commonly known as: ZOLOFT     traZODone 50 MG tablet  Commonly known as: DESYREL     vitamin D 25 MCG (1000 UT) Tabs

## 2024-08-26 NOTE — ED TRIAGE NOTES
Patient brought to ER by EMS due to ground level fall while trying to get into the shower patient tried to grab onto the wall handle and it broke patient with history leukemia and problems with her shoulder in the past patient is currently on hospice

## 2024-08-27 ENCOUNTER — HOSPITAL ENCOUNTER (INPATIENT)
Facility: HOSPITAL | Age: 69
LOS: 5 days | Discharge: HOSPICE | DRG: 951 | End: 2024-09-01
Attending: FAMILY MEDICINE | Admitting: FAMILY MEDICINE
Payer: COMMERCIAL

## 2024-08-27 VITALS
HEIGHT: 60 IN | BODY MASS INDEX: 24.6 KG/M2 | HEART RATE: 80 BPM | WEIGHT: 125.3 LBS | DIASTOLIC BLOOD PRESSURE: 67 MMHG | SYSTOLIC BLOOD PRESSURE: 99 MMHG | RESPIRATION RATE: 17 BRPM | TEMPERATURE: 97 F | OXYGEN SATURATION: 99 %

## 2024-08-27 PROBLEM — Z85.89 HISTORY OF MALIGNANT NEOPLASM METASTATIC TO BONE: Status: ACTIVE | Noted: 2024-08-27

## 2024-08-27 PROCEDURE — 96376 TX/PRO/DX INJ SAME DRUG ADON: CPT

## 2024-08-27 PROCEDURE — G0378 HOSPITAL OBSERVATION PER HR: HCPCS

## 2024-08-27 PROCEDURE — 6370000000 HC RX 637 (ALT 250 FOR IP): Performed by: FAMILY MEDICINE

## 2024-08-27 PROCEDURE — 96361 HYDRATE IV INFUSION ADD-ON: CPT

## 2024-08-27 PROCEDURE — 6360000002 HC RX W HCPCS: Performed by: FAMILY MEDICINE

## 2024-08-27 PROCEDURE — 2580000003 HC RX 258: Performed by: INTERNAL MEDICINE

## 2024-08-27 PROCEDURE — 6370000000 HC RX 637 (ALT 250 FOR IP): Performed by: INTERNAL MEDICINE

## 2024-08-27 PROCEDURE — 6360000002 HC RX W HCPCS: Performed by: INTERNAL MEDICINE

## 2024-08-27 PROCEDURE — 1100000000 HC RM PRIVATE

## 2024-08-27 PROCEDURE — 96374 THER/PROPH/DIAG INJ IV PUSH: CPT

## 2024-08-27 PROCEDURE — 2580000003 HC RX 258: Performed by: FAMILY MEDICINE

## 2024-08-27 RX ORDER — SODIUM CHLORIDE 9 MG/ML
INJECTION, SOLUTION INTRAVENOUS CONTINUOUS
Status: DISCONTINUED | OUTPATIENT
Start: 2024-08-27 | End: 2024-08-27 | Stop reason: HOSPADM

## 2024-08-27 RX ORDER — POLYETHYLENE GLYCOL 3350 17 G/17G
17 POWDER, FOR SOLUTION ORAL DAILY
COMMUNITY

## 2024-08-27 RX ORDER — OXYCODONE HYDROCHLORIDE 10 MG/1
10 TABLET ORAL EVERY 4 HOURS PRN
Status: DISCONTINUED | OUTPATIENT
Start: 2024-08-27 | End: 2024-09-01 | Stop reason: HOSPADM

## 2024-08-27 RX ORDER — ACETAMINOPHEN 650 MG/1
650 SUPPOSITORY RECTAL EVERY 6 HOURS PRN
Status: DISCONTINUED | OUTPATIENT
Start: 2024-08-27 | End: 2024-08-27 | Stop reason: HOSPADM

## 2024-08-27 RX ORDER — AMLODIPINE BESYLATE 2.5 MG/1
2.5 TABLET ORAL DAILY
Status: DISCONTINUED | OUTPATIENT
Start: 2024-08-27 | End: 2024-08-27

## 2024-08-27 RX ORDER — ACYCLOVIR 200 MG/1
200 CAPSULE ORAL 2 TIMES DAILY
Status: DISCONTINUED | OUTPATIENT
Start: 2024-08-27 | End: 2024-08-28

## 2024-08-27 RX ORDER — HYDROXYCHLOROQUINE SULFATE 200 MG/1
200 TABLET, FILM COATED ORAL 2 TIMES DAILY
Status: DISCONTINUED | OUTPATIENT
Start: 2024-08-27 | End: 2024-08-27

## 2024-08-27 RX ORDER — MAGNESIUM SULFATE IN WATER 40 MG/ML
2000 INJECTION, SOLUTION INTRAVENOUS PRN
Status: DISCONTINUED | OUTPATIENT
Start: 2024-08-27 | End: 2024-08-27 | Stop reason: HOSPADM

## 2024-08-27 RX ORDER — SODIUM CHLORIDE 0.9 % (FLUSH) 0.9 %
5-40 SYRINGE (ML) INJECTION EVERY 12 HOURS SCHEDULED
Status: DISCONTINUED | OUTPATIENT
Start: 2024-08-27 | End: 2024-08-27 | Stop reason: HOSPADM

## 2024-08-27 RX ORDER — POTASSIUM CHLORIDE 1500 MG/1
40 TABLET, EXTENDED RELEASE ORAL PRN
Status: DISCONTINUED | OUTPATIENT
Start: 2024-08-27 | End: 2024-08-27 | Stop reason: HOSPADM

## 2024-08-27 RX ORDER — ACYCLOVIR 200 MG/1
200 CAPSULE ORAL 2 TIMES DAILY
Status: DISCONTINUED | OUTPATIENT
Start: 2024-08-27 | End: 2024-08-27 | Stop reason: HOSPADM

## 2024-08-27 RX ORDER — POLYETHYLENE GLYCOL 3350 17 G/17G
17 POWDER, FOR SOLUTION ORAL DAILY
Status: DISCONTINUED | OUTPATIENT
Start: 2024-08-27 | End: 2024-09-01 | Stop reason: HOSPADM

## 2024-08-27 RX ORDER — SENNOSIDES A AND B 8.6 MG/1
1 TABLET, FILM COATED ORAL 2 TIMES DAILY PRN
Status: DISCONTINUED | OUTPATIENT
Start: 2024-08-27 | End: 2024-09-01 | Stop reason: HOSPADM

## 2024-08-27 RX ORDER — MORPHINE SULFATE 4 MG/ML
4 INJECTION, SOLUTION INTRAMUSCULAR; INTRAVENOUS
Status: DISCONTINUED | OUTPATIENT
Start: 2024-08-27 | End: 2024-08-27 | Stop reason: HOSPADM

## 2024-08-27 RX ORDER — ACETAMINOPHEN 325 MG/1
650 TABLET ORAL EVERY 4 HOURS PRN
Status: DISCONTINUED | OUTPATIENT
Start: 2024-08-27 | End: 2024-09-01 | Stop reason: HOSPADM

## 2024-08-27 RX ORDER — MORPHINE SULFATE 15 MG/1
30 TABLET, FILM COATED, EXTENDED RELEASE ORAL EVERY 12 HOURS SCHEDULED
Status: DISCONTINUED | OUTPATIENT
Start: 2024-08-27 | End: 2024-09-01 | Stop reason: HOSPADM

## 2024-08-27 RX ORDER — POTASSIUM CHLORIDE 7.45 MG/ML
10 INJECTION INTRAVENOUS PRN
Status: DISCONTINUED | OUTPATIENT
Start: 2024-08-27 | End: 2024-08-27

## 2024-08-27 RX ORDER — FOLIC ACID 1 MG/1
1 TABLET ORAL DAILY
Status: DISCONTINUED | OUTPATIENT
Start: 2024-08-27 | End: 2024-08-27 | Stop reason: HOSPADM

## 2024-08-27 RX ORDER — PROCHLORPERAZINE MALEATE 5 MG
5 TABLET ORAL EVERY 6 HOURS PRN
Status: DISCONTINUED | OUTPATIENT
Start: 2024-08-27 | End: 2024-08-27

## 2024-08-27 RX ORDER — SODIUM CHLORIDE 0.9 % (FLUSH) 0.9 %
5-40 SYRINGE (ML) INJECTION PRN
Status: DISCONTINUED | OUTPATIENT
Start: 2024-08-27 | End: 2024-09-01 | Stop reason: HOSPADM

## 2024-08-27 RX ORDER — MORPHINE SULFATE 2 MG/ML
2 INJECTION, SOLUTION INTRAMUSCULAR; INTRAVENOUS
Status: DISCONTINUED | OUTPATIENT
Start: 2024-08-27 | End: 2024-08-27

## 2024-08-27 RX ORDER — IBUPROFEN 600 MG/1
600 TABLET, FILM COATED ORAL 2 TIMES DAILY
Status: DISCONTINUED | OUTPATIENT
Start: 2024-08-27 | End: 2024-08-27 | Stop reason: HOSPADM

## 2024-08-27 RX ORDER — LORAZEPAM 0.5 MG/1
0.5 TABLET ORAL EVERY 4 HOURS PRN
COMMUNITY

## 2024-08-27 RX ORDER — MIRTAZAPINE 15 MG/1
45 TABLET, FILM COATED ORAL NIGHTLY
Status: DISCONTINUED | OUTPATIENT
Start: 2024-08-27 | End: 2024-09-01 | Stop reason: HOSPADM

## 2024-08-27 RX ORDER — MEGESTROL ACETATE 40 MG/1
40 TABLET ORAL DAILY
Status: DISCONTINUED | OUTPATIENT
Start: 2024-08-27 | End: 2024-08-27 | Stop reason: HOSPADM

## 2024-08-27 RX ORDER — ACETAMINOPHEN 325 MG/1
650 TABLET ORAL EVERY 6 HOURS PRN
Status: DISCONTINUED | OUTPATIENT
Start: 2024-08-27 | End: 2024-08-27 | Stop reason: HOSPADM

## 2024-08-27 RX ORDER — ONDANSETRON 2 MG/ML
4 INJECTION INTRAMUSCULAR; INTRAVENOUS EVERY 6 HOURS PRN
Status: DISCONTINUED | OUTPATIENT
Start: 2024-08-27 | End: 2024-08-27 | Stop reason: HOSPADM

## 2024-08-27 RX ORDER — SODIUM CHLORIDE 9 MG/ML
INJECTION, SOLUTION INTRAVENOUS PRN
Status: DISCONTINUED | OUTPATIENT
Start: 2024-08-27 | End: 2024-09-01 | Stop reason: HOSPADM

## 2024-08-27 RX ORDER — LIDOCAINE 4 G/G
1 PATCH TOPICAL DAILY
Status: DISCONTINUED | OUTPATIENT
Start: 2024-08-27 | End: 2024-08-27

## 2024-08-27 RX ORDER — PROMETHAZINE HYDROCHLORIDE 25 MG/1
12.5 TABLET ORAL EVERY 6 HOURS PRN
Status: DISCONTINUED | OUTPATIENT
Start: 2024-08-27 | End: 2024-09-01 | Stop reason: HOSPADM

## 2024-08-27 RX ORDER — POLYETHYLENE GLYCOL 3350 17 G/17G
17 POWDER, FOR SOLUTION ORAL DAILY PRN
Status: DISCONTINUED | OUTPATIENT
Start: 2024-08-27 | End: 2024-08-27 | Stop reason: HOSPADM

## 2024-08-27 RX ORDER — DULOXETIN HYDROCHLORIDE 30 MG/1
60 CAPSULE, DELAYED RELEASE ORAL 2 TIMES DAILY
Status: DISCONTINUED | OUTPATIENT
Start: 2024-08-27 | End: 2024-08-27 | Stop reason: HOSPADM

## 2024-08-27 RX ORDER — SODIUM CHLORIDE 9 MG/ML
INJECTION, SOLUTION INTRAVENOUS PRN
Status: DISCONTINUED | OUTPATIENT
Start: 2024-08-27 | End: 2024-08-27 | Stop reason: HOSPADM

## 2024-08-27 RX ORDER — OXYCODONE HYDROCHLORIDE 5 MG/1
5 TABLET ORAL EVERY 4 HOURS PRN
Status: DISCONTINUED | OUTPATIENT
Start: 2024-08-27 | End: 2024-09-01 | Stop reason: HOSPADM

## 2024-08-27 RX ORDER — SODIUM CHLORIDE 0.9 % (FLUSH) 0.9 %
5-40 SYRINGE (ML) INJECTION EVERY 12 HOURS SCHEDULED
Status: DISCONTINUED | OUTPATIENT
Start: 2024-08-27 | End: 2024-08-30

## 2024-08-27 RX ORDER — LIDOCAINE 4 G/G
1 PATCH TOPICAL DAILY
Status: DISCONTINUED | OUTPATIENT
Start: 2024-08-27 | End: 2024-09-01 | Stop reason: HOSPADM

## 2024-08-27 RX ORDER — ATORVASTATIN CALCIUM 40 MG/1
80 TABLET, FILM COATED ORAL DAILY
Status: DISCONTINUED | OUTPATIENT
Start: 2024-08-27 | End: 2024-08-27

## 2024-08-27 RX ORDER — PANTOPRAZOLE SODIUM 40 MG/1
40 TABLET, DELAYED RELEASE ORAL
Status: DISCONTINUED | OUTPATIENT
Start: 2024-08-27 | End: 2024-08-27 | Stop reason: HOSPADM

## 2024-08-27 RX ORDER — KETOROLAC TROMETHAMINE 15 MG/ML
15 INJECTION, SOLUTION INTRAMUSCULAR; INTRAVENOUS EVERY 6 HOURS PRN
Status: DISCONTINUED | OUTPATIENT
Start: 2024-08-27 | End: 2024-08-27 | Stop reason: HOSPADM

## 2024-08-27 RX ORDER — TRAZODONE HYDROCHLORIDE 50 MG/1
50 TABLET, FILM COATED ORAL NIGHTLY
Status: DISCONTINUED | OUTPATIENT
Start: 2024-08-27 | End: 2024-08-27 | Stop reason: HOSPADM

## 2024-08-27 RX ORDER — LORAZEPAM 0.5 MG/1
0.5 TABLET ORAL EVERY 4 HOURS PRN
Status: DISCONTINUED | OUTPATIENT
Start: 2024-08-27 | End: 2024-09-01 | Stop reason: HOSPADM

## 2024-08-27 RX ORDER — MORPHINE SULFATE 15 MG/1
15 TABLET, FILM COATED, EXTENDED RELEASE ORAL EVERY 12 HOURS SCHEDULED
Status: DISCONTINUED | OUTPATIENT
Start: 2024-08-27 | End: 2024-08-27 | Stop reason: HOSPADM

## 2024-08-27 RX ORDER — SODIUM CHLORIDE 0.9 % (FLUSH) 0.9 %
5-40 SYRINGE (ML) INJECTION PRN
Status: DISCONTINUED | OUTPATIENT
Start: 2024-08-27 | End: 2024-08-27 | Stop reason: HOSPADM

## 2024-08-27 RX ORDER — ONDANSETRON 4 MG/1
4 TABLET, ORALLY DISINTEGRATING ORAL EVERY 8 HOURS PRN
Status: DISCONTINUED | OUTPATIENT
Start: 2024-08-27 | End: 2024-08-27 | Stop reason: HOSPADM

## 2024-08-27 RX ORDER — FAMOTIDINE 20 MG/1
20 TABLET, FILM COATED ORAL 2 TIMES DAILY
Status: DISCONTINUED | OUTPATIENT
Start: 2024-08-27 | End: 2024-08-27 | Stop reason: HOSPADM

## 2024-08-27 RX ORDER — OXYCODONE AND ACETAMINOPHEN 5; 325 MG/1; MG/1
1 TABLET ORAL EVERY 6 HOURS PRN
Status: DISCONTINUED | OUTPATIENT
Start: 2024-08-27 | End: 2024-08-27 | Stop reason: HOSPADM

## 2024-08-27 RX ORDER — PANTOPRAZOLE SODIUM 40 MG/1
40 TABLET, DELAYED RELEASE ORAL
Status: DISCONTINUED | OUTPATIENT
Start: 2024-08-28 | End: 2024-09-01 | Stop reason: HOSPADM

## 2024-08-27 RX ADMIN — SODIUM CHLORIDE: 9 INJECTION, SOLUTION INTRAVENOUS at 10:59

## 2024-08-27 RX ADMIN — FAMOTIDINE 20 MG: 20 TABLET, FILM COATED ORAL at 10:03

## 2024-08-27 RX ADMIN — ACYCLOVIR 200 MG: 200 CAPSULE ORAL at 10:03

## 2024-08-27 RX ADMIN — MORPHINE SULFATE 2 MG: 2 INJECTION, SOLUTION INTRAMUSCULAR; INTRAVENOUS at 00:50

## 2024-08-27 RX ADMIN — TRAZODONE HYDROCHLORIDE 50 MG: 50 TABLET ORAL at 01:02

## 2024-08-27 RX ADMIN — SERTRALINE 100 MG: 50 TABLET, FILM COATED ORAL at 10:02

## 2024-08-27 RX ADMIN — IBUPROFEN 600 MG: 600 TABLET, FILM COATED ORAL at 10:03

## 2024-08-27 RX ADMIN — SODIUM CHLORIDE: 9 INJECTION, SOLUTION INTRAVENOUS at 00:54

## 2024-08-27 RX ADMIN — MORPHINE SULFATE 30 MG: 15 TABLET, FILM COATED, EXTENDED RELEASE ORAL at 21:20

## 2024-08-27 RX ADMIN — SERTRALINE 100 MG: 50 TABLET, FILM COATED ORAL at 17:05

## 2024-08-27 RX ADMIN — MORPHINE SULFATE 15 MG: 15 TABLET, FILM COATED, EXTENDED RELEASE ORAL at 12:05

## 2024-08-27 RX ADMIN — ACYCLOVIR 200 MG: 200 CAPSULE ORAL at 21:19

## 2024-08-27 RX ADMIN — MEGESTROL ACETATE 40 MG: 40 TABLET ORAL at 10:03

## 2024-08-27 RX ADMIN — FOLIC ACID 1 MG: 1 TABLET ORAL at 10:03

## 2024-08-27 RX ADMIN — HYDROXYCHLOROQUINE SULFATE 200 MG: 200 TABLET ORAL at 10:03

## 2024-08-27 RX ADMIN — MORPHINE SULFATE 2 MG: 2 INJECTION, SOLUTION INTRAMUSCULAR; INTRAVENOUS at 06:30

## 2024-08-27 RX ADMIN — POLYETHYLENE GLYCOL 3350 17 G: 17 POWDER, FOR SOLUTION ORAL at 17:05

## 2024-08-27 RX ADMIN — OXYCODONE AND ACETAMINOPHEN 1 TABLET: 5; 325 TABLET ORAL at 07:40

## 2024-08-27 RX ADMIN — PANTOPRAZOLE SODIUM 40 MG: 40 TABLET, DELAYED RELEASE ORAL at 07:41

## 2024-08-27 RX ADMIN — MORPHINE SULFATE 4 MG: 4 INJECTION, SOLUTION INTRAMUSCULAR; INTRAVENOUS at 13:17

## 2024-08-27 RX ADMIN — SODIUM CHLORIDE, PRESERVATIVE FREE 10 ML: 5 INJECTION INTRAVENOUS at 21:20

## 2024-08-27 RX ADMIN — MIRTAZAPINE 45 MG: 15 TABLET, FILM COATED ORAL at 21:19

## 2024-08-27 RX ADMIN — DULOXETINE HYDROCHLORIDE 60 MG: 30 CAPSULE, DELAYED RELEASE ORAL at 10:04

## 2024-08-27 RX ADMIN — MORPHINE SULFATE 4 MG: 4 INJECTION, SOLUTION INTRAMUSCULAR; INTRAVENOUS at 09:59

## 2024-08-27 ASSESSMENT — PAIN DESCRIPTION - ORIENTATION
ORIENTATION: LEFT

## 2024-08-27 ASSESSMENT — PAIN SCALES - GENERAL
PAINLEVEL_OUTOF10: 9
PAINLEVEL_OUTOF10: 0
PAINLEVEL_OUTOF10: 0
PAINLEVEL_OUTOF10: 2
PAINLEVEL_OUTOF10: 7
PAINLEVEL_OUTOF10: 8
PAINLEVEL_OUTOF10: 1
PAINLEVEL_OUTOF10: 7
PAINLEVEL_OUTOF10: 0
PAINLEVEL_OUTOF10: 6
PAINLEVEL_OUTOF10: 8
PAINLEVEL_OUTOF10: 6
PAINLEVEL_OUTOF10: 7

## 2024-08-27 ASSESSMENT — PAIN DESCRIPTION - DESCRIPTORS
DESCRIPTORS: ACHING;GNAWING
DESCRIPTORS: GNAWING
DESCRIPTORS: GNAWING
DESCRIPTORS: ACHING
DESCRIPTORS: ACHING;GNAWING

## 2024-08-27 ASSESSMENT — PAIN DESCRIPTION - LOCATION
LOCATION: ARM

## 2024-08-27 ASSESSMENT — PAIN SCALES - WONG BAKER
WONGBAKER_NUMERICALRESPONSE: NO HURT
WONGBAKER_NUMERICALRESPONSE: NO HURT

## 2024-08-27 NOTE — ED NOTES
Family came back out into [place] stating 'we are still waiting for xray that never came back and she wants to go home now since no one is helping her'. Family informed that xray is tied up with an emergency at the moment. Family states 'my mom was here first and no one has helped her so she just wants to go'. Family informed of the priority process of the emergency department.

## 2024-08-27 NOTE — PROGRESS NOTES
Patient resting quietly on right side with eyes closed.- no changes in earlier nursing head to toe assessment or  admission assessment on previous assessment.

## 2024-08-27 NOTE — PROGRESS NOTES
Hospitalist Progress Note          Dr. Ivana Lara MD      Date:8/27/2024       Room:[facility]  Patient Name:Madyson Rhodes     YOB: 1955     Age:69 y.o.      Chief Complaint   Patient presents with    Fall         HPI as per admitting provider on 8/26/2024      Subjective      8/27/2024    Patient seen by me for follow-up around 9 AM today, blood pressures are soft.  Labs reviewed.  Patient on home hospice per records.  She had received 1 dose of p.o. oxycodone and some IV morphine overnight.  This morning she told me the pain in her left arm is 10 out of 10 and she is asking what caused it.  I explained there is a fracture there related to the cancer.  She notes some right sided abdominal pain as well but appears that she has been drinking well.  Denies nausea or vomiting.  She states she does feel a little short of breath but I observed she has normal respiratory rate and effort.  She asks if she can go home today.  She seems a bit disoriented.  I explained that we should get a good pain medication regimen going for her and ensure that things are safe for her at home, I see she has had multiple ER visits recently.  It sounds like family is asking for a respite stay as well.  It may be that the family is not able to take care of her at home and she may need a respite or permanent placement under hospice.    To discuss further with family, case management, and hospice agency.        Review of Systems    Objective           Vitals Last 24 Hours:  Patient Vitals for the past 24 hrs:   Temp Pulse Resp BP SpO2   08/27/24 0740 -- -- 16 -- --   08/27/24 0733 97.3 °F (36.3 °C) 93 18 (!) 89/69 97 %   08/27/24 0420 98.3 °F (36.8 °C) 82 16 107/65 96 %   08/27/24 0120 -- -- 16 -- --   08/26/24 2330 97.5 °F (36.4 °C) 90 16 106/67 96 %   08/26/24 2231 -- -- -- 105/73 95 %   08/26/24 2031 -- -- -- 121/72 98 %   08/26/24 2001 -- -- -- (!) 96/53 98 %   08/26/24 1931 -- -- -- 123/71 97 %   08/26/24 1901 --     polyethylene glycol (GLYCOLAX) packet 17 g  17 g Oral Daily PRN    acetaminophen (TYLENOL) tablet 650 mg  650 mg Oral Q6H PRN    Or    acetaminophen (TYLENOL) suppository 650 mg  650 mg Rectal Q6H PRN    0.9 % sodium chloride infusion   IntraVENous Continuous    ketorolac (TORADOL) injection 15 mg  15 mg IntraVENous Q6H PRN    morphine sulfate (PF) injection 4 mg  4 mg IntraVENous Q2H PRN         Allergies         Sulfa antibiotics and Sulfasalazine       Labs/Imaging/Diagnostics      Labs:  No results found for this or any previous visit (from the past 48 hour(s)).     Imaging:  CT LUMBAR SPINE WO CONTRAST    Result Date: 8/26/2024  1. Pathologic fracture of the T10 vertebral body with epidural extension of tumor. 2. Multiple other metastases are present including large lesions in the pelvis 3. Diffuse hypodensity in the right hepatic lobe as well as scattered other lesions in the liver suspicious for malignancy. 4. Biliary dilatation with gallbladder distention not well evaluated on this study. Probable adenopathy in the upper abdomen around the georges hepatis and the root of the mesentery. 5. Mild L1 compression fracture of uncertain age. Electronically signed by Inocencio Montgomery    XR ELBOW LEFT (2 VIEWS)    Result Date: 8/26/2024  Study limited by suboptimal positioning. No definite acute fracture or aggressive osseous lesion. Electronically signed by NATASHA GARCES    XR HUMERUS LEFT (MIN 2 VIEWS)    Result Date: 8/26/2024  Worsening displacement of pathologic lytic fracture of the proximal humeral shaft. Electronically signed by NATASHA GARCES    XR SHOULDER LEFT (MIN 2 VIEWS)    Result Date: 8/24/2024  Acute to subacute healing proximal humeral metadiaphysis fracture, with possible underlying pathologic lytic lesion. Electronically signed by NATASHA GARCES       Assessment//Plan           Problem List:  Hospital Problems             Last Modified POA    * (Principal) Metastatic cancer to bone (HCC) 8/26/2024 Yes

## 2024-08-27 NOTE — PROGRESS NOTES
Into ar[facility] per bed alert and oriented x 4. Assisted to bed. Oriented to room set up. Informed on call on admission. Promoted rest.

## 2024-08-27 NOTE — ED NOTES
Patient family came out of room stating 'the xray girl came to do an xray of my mom but said she needed to get help but she hasn't come back'. Explained to family that the department has become busy at the moment and there was not a nurse able to help at the time. Family states 'this doesn't make sense, she got here first'.

## 2024-08-27 NOTE — CARE COORDINATION
RUR: N/A (OBS patient)  Readmission? No   IM? N/A  ? N/A     Plan: Discharge home with hospice services vs transition to respite care in the hospital.     CM spoke with pt's [facility] Social Worker, Magnolia Bhardwaj [phone], via phone to complete initial case management assessment. Magnolia confirmed that [facility] will assist with transportation for pt upon discharge.     Pharmacy: [id number], [address] [phone] - F [phone] [[id number]]     Pt lives at the address listed on her facesheet with her son. Per Magnolia, pt uses a walker, bedside commode, and shower chair at home and requires assistance with her ADLs/IADLs. Pt's son assists pt with her ADLS/IADLs, and pt also receives assistance from a hospice aide three days per week. [facility] is working on getting pt set up with additional DME (including a wheelchair and hospital bed) and a home health aide [facility]. [facility] It is unclear whether pt has a hx of home health services. Pt does not have a hx of home O2 or a stay at a SNF/IPR for rehab services. [facility]/pt's family would like pt to transition to respite care upon medical discharge from [facility]. CM to communicate this to attending provider.    Case management following for discharge planning and coordination of services.     08/27/24 0905   Service Assessment   Patient Orientation Alert and Oriented   Cognition Alert   History Provided By Other (see comment)  ([facility] Social Worker, Magnolia Bhardwaj, [phone])   Primary Caregiver Other (Comment)  (Son and [facility] Aide)   Accompanied By/Relationship None   Support Systems Children;Hospice   Patient's Healthcare Decision Maker is: Legal Next of Kin  (Son, Rinku Crow, [phone])   PCP Verified by CM Yes  (Mark Mc MD ([facility]))   Last Visit to PCP Within last 3 months    Prior Functional Level Assistance with the following:;Bathing;Dressing;Toileting;Cooking;Housework;Shopping;Mobility   Current Functional Level Assistance with the following:;Bathing;Dressing;Toileting;Cooking;Housework;Shopping;Mobility   Can patient return to prior living arrangement Yes  (Disposition plan pending)   Ability to make needs known: Other (see comment)  (CM did not assess)   Family able to assist with home care needs: Yes  (patient's son lives with her and is able to assist)   Would you like for me to discuss the discharge plan with any other family members/significant others, and if so, who? Yes  (Hospice Social Worker, Magnolia Bhardwaj)   Financial Resources  ([facility])   Community Resources Hospice;Transportation   CM/SW Referral Other (see comment)  (Discharge planning, assessment of discharge needs, coordination with hospice agency)   Discharge Planning   Type of Residence Apartment   Living Arrangements Children   Current Services Prior To Admission Durable Medical Equipment   Current DME Prior to Arrival Walker;Shower Chair;Bedside Commode   Potential Assistance Needed Other (Comment)  (Discharge planning, assessment of discharge needs, coordination with hospice agency)   DME Ordered? No   Potential Assistance Purchasing Medications No   Type of Home Care Services Aide Services  (Hospice aide 3x per week)   Patient expects to be discharged to: Apartment   Follow Up Appointment: Best Day/Time    (N/A)   One/Two Story Residence One story   History of falls? 1  (Last night in shower)   Services At/After Discharge   Transition of Care Consult (CM Consult) Discharge Planning;Hospice   Internal Hospice No   Reason Outside Agency Chosen Patient already serviced by other home care/hospice agency   Services At/After Discharge Hospice    Resource Information Provided? No  (Patient is a , but [facility])   Mode of Transport at Discharge Other (see

## 2024-08-27 NOTE — DISCHARGE SUMMARY
extremity in a sling.  Moving lower extremities within the bed easily.    Labs:    No results found for this or any previous visit (from the past 24 hour(s)).      Imaging:  CT LUMBAR SPINE WO CONTRAST    Result Date: 8/26/2024  1. Pathologic fracture of the T10 vertebral body with epidural extension of tumor. 2. Multiple other metastases are present including large lesions in the pelvis 3. Diffuse hypodensity in the right hepatic lobe as well as scattered other lesions in the liver suspicious for malignancy. 4. Biliary dilatation with gallbladder distention not well evaluated on this study. Probable adenopathy in the upper abdomen around the georges hepatis and the root of the mesentery. 5. Mild L1 compression fracture of uncertain age. Electronically signed by Inocencio Montgomery    XR ELBOW LEFT (2 VIEWS)    Result Date: 8/26/2024  Study limited by suboptimal positioning. No definite acute fracture or aggressive osseous lesion. Electronically signed by NATASHADMITRIY GARCES    XR HUMERUS LEFT (MIN 2 VIEWS)    Result Date: 8/26/2024  Worsening displacement of pathologic lytic fracture of the proximal humeral shaft. Electronically signed by [email or web address]    XR SHOULDER LEFT (MIN 2 VIEWS)    Result Date: 8/24/2024  Acute to subacute healing proximal humeral metadiaphysis fracture, with possible underlying pathologic lytic lesion. Electronically signed by NATASHA GARCES               * Discharge Condition:  terminal   * Disposition:  dc from acute obs stay,  transition to Respite hospice stay w [facility]       Discharge Medications:     Medication List        CONTINUE taking these medications      acyclovir 200 MG capsule  Commonly known as: ZOVIRAX     albuterol sulfate  (90 Base) MCG/ACT inhaler  Commonly known as: PROVENTIL;VENTOLIN;PROAIR     DULoxetine 60 MG extended release capsule  Commonly known as: CYMBALTA     famotidine 20 MG tablet  Commonly known as: PEPCID     lidocaine viscous hcl 2 % Soln solution  Commonly  known as: XYLOCAINE     LORazepam 0.5 MG tablet  Commonly known as: ATIVAN     omeprazole 20 MG delayed release capsule  Commonly known as: PRILOSEC     polyethylene glycol 17 g Pack packet  Commonly known as: MIRALAX     promethazine 25 MG tablet  Commonly known as: PHENERGAN     sertraline 100 MG tablet  Commonly known as: ZOLOFT     traZODone 50 MG tablet  Commonly known as: DESYREL            STOP taking these medications      adalimumab 40 MG/0.8ML injection  Commonly known as: HUMIRA     amLODIPine 2.5 MG tablet  Commonly known as: NORVASC     atorvastatin 80 MG tablet  Commonly known as: LIPITOR     etodolac 400 MG tablet  Commonly known as: LODINE     folic acid 1 MG tablet  Commonly known as: FOLVITE     hydroxychloroquine 200 MG tablet  Commonly known as: PLAQUENIL     ibuprofen 600 MG tablet  Commonly known as: ADVIL;MOTRIN     megestrol 40 MG tablet  Commonly known as: MEGACE     oxyCODONE-acetaminophen 5-325 MG per tablet  Commonly known as: Percocet     vitamin D 25 MCG (1000 UT) Tabs tablet  Commonly known as: CHOLECALCIFEROL                * Follow-up Care/Patient Instructions:  Activity: activity as tolerated and functionally bedbound  Diet:  as tolerated       Sharad Jovel MD  [address] [facility] [address] [phone]    Follow up        Stage IV malignancy, hospice care, acute on chronic pain, bony mets with pathological fracture L humerus   - Increased morphine dose  - Reviewed MAR from hospice records - ativan 0.5mg PO PRN, mirtazapine 45mg QHS PRN, miralax, omeprazole, acyclovir, vit B, morphine ER 30 BiD, oxycodone 5mg PO Q4h PRN, senna prn, trazodone 50mg QHS, zoloft 100mg QD       --> to d/w case mgmt, possible respite hospice respite stay.  Long-term plan unclear, family seems to be having difficulty caring for pt at home. Likely needs long-term placement.  Discussed with nursing and nurse manager.          DNR   Prognosis:  on the order of weeks to months      40min spent on

## 2024-08-27 NOTE — PLAN OF CARE
Problem: Pain  Goal: Verbalizes/displays adequate comfort level or baseline comfort level  8/27/2024 1020 by Senia Abraham, RN  Outcome: Progressing  Flowsheets (Taken 8/27/2024 1020)  Verbalizes/displays adequate comfort level or baseline comfort level:   Encourage patient to monitor pain and request assistance   Assess pain using appropriate pain scale   Administer analgesics based on type and severity of pain and evaluate response   Implement non-pharmacological measures as appropriate and evaluate response  8/27/2024 0119 by Gretta Atwood, RN  Outcome: Progressing     Problem: Skin/Tissue Integrity  Goal: Absence of new skin breakdown  Description: 1.  Monitor for areas of redness and/or skin breakdown  2.  Assess vascular access sites hourly  3.  Every 4-6 hours minimum:  Change oxygen saturation probe site  4.  Every 4-6 hours:  If on nasal continuous positive airway pressure, respiratory therapy assess nares and determine need for appliance change or resting period.  8/27/2024 0119 by Gretta Atwood, RN  Outcome: Progressing     Problem: Safety - Adult  Goal: Free from fall injury  8/27/2024 0119 by Gretta Atwood, RN  Outcome: Progressing

## 2024-08-28 PROCEDURE — 2580000003 HC RX 258: Performed by: FAMILY MEDICINE

## 2024-08-28 PROCEDURE — 1100000000 HC RM PRIVATE

## 2024-08-28 PROCEDURE — 6370000000 HC RX 637 (ALT 250 FOR IP): Performed by: FAMILY MEDICINE

## 2024-08-28 RX ADMIN — MIRTAZAPINE 45 MG: 15 TABLET, FILM COATED ORAL at 21:17

## 2024-08-28 RX ADMIN — SERTRALINE 100 MG: 50 TABLET, FILM COATED ORAL at 09:41

## 2024-08-28 RX ADMIN — PANTOPRAZOLE SODIUM 40 MG: 40 TABLET, DELAYED RELEASE ORAL at 07:51

## 2024-08-28 RX ADMIN — ACYCLOVIR 200 MG: 200 CAPSULE ORAL at 09:41

## 2024-08-28 RX ADMIN — SODIUM CHLORIDE, PRESERVATIVE FREE 10 ML: 5 INJECTION INTRAVENOUS at 21:18

## 2024-08-28 RX ADMIN — MORPHINE SULFATE 30 MG: 15 TABLET, FILM COATED, EXTENDED RELEASE ORAL at 09:41

## 2024-08-28 RX ADMIN — SODIUM CHLORIDE, PRESERVATIVE FREE 10 ML: 5 INJECTION INTRAVENOUS at 09:42

## 2024-08-28 RX ADMIN — POLYETHYLENE GLYCOL 3350 17 G: 17 POWDER, FOR SOLUTION ORAL at 09:41

## 2024-08-28 RX ADMIN — MORPHINE SULFATE 30 MG: 15 TABLET, FILM COATED, EXTENDED RELEASE ORAL at 21:17

## 2024-08-28 ASSESSMENT — PAIN DESCRIPTION - DESCRIPTORS: DESCRIPTORS: ACHING

## 2024-08-28 ASSESSMENT — PAIN DESCRIPTION - ORIENTATION: ORIENTATION: LOWER

## 2024-08-28 ASSESSMENT — PAIN SCALES - GENERAL
PAINLEVEL_OUTOF10: 5
PAINLEVEL_OUTOF10: 0
PAINLEVEL_OUTOF10: 0

## 2024-08-28 ASSESSMENT — PAIN DESCRIPTION - LOCATION: LOCATION: BACK

## 2024-08-28 NOTE — H&P
History and Physical  Dr. Ivana Lara MD      Chief Complaints:   No chief complaint on file.  Hospice Respite stay        Subjective:     Madyson Rhodes is a 69 y.o. female followed by Sharad Jovel MD and  has a past medical history of Anxiety, Bronchitis, Bursitis of shoulder, left, Cancer (HCC), Cannabis abuse, Cellulitis of face, Depression, Dizziness and giddiness, Esophageal reflux, Fatigue, Fecal occult blood test positive, GERD (gastroesophageal reflux disease), Headache, HTN (hypertension), Obesity, Osteoarthritis, Rheumatoid arthritis (HCC), Rotator cuff syndrome, and Tobacco use.      Patient seen by me today around 9:50am, was sleeping comfortably and woke to voice.  At this time she is here for a respite hospital stay.  Patient told me she is feeling fairly comfortable and has been able to rest and sleep. Pain in L arm is 6/10 down from 10/10.  She denies abd pain, nausea, or feeling constipated. Denies feeling SOB.     Patient is asking to go home but is confused, and unfortunately going home is probably not an option.  I spoke with the patient's home hospice nurse today who notes family is having a lot of trouble taking care of her at home and patient has been noncompliant, may have been drinking alcohol at home and not taking her meds.  At this point the goal is comfort anyway so getting her comfort meds is important but others are not crucial.  If she would like alcohol or cannabis that would be fine with me as she approaches end-of-life but unfortunately this is probably not possible in a healthcare setting.    Plan is [facility] to a [facility] after this respite stay.  Patient is a .        Past Medical History:   Diagnosis Date    Anxiety 4/6/2021    Bronchitis 4/6/2021    Bursitis of shoulder, left 4/6/2021    Cancer (HCC)     Cannabis abuse 4/6/2021    Cellulitis of face 4/6/2021    Depression 4/6/2021    Dizziness and giddiness 4/6/2021    Esophageal reflux

## 2024-08-28 NOTE — PROGRESS NOTES
Family alerted staff that patient needed assistance. Nurse in room x2, patient removed purwick and diaper, soiled of urine. Patient cleaned , applied new gown and diaper. Hospice nurse present and assisted patient with lunch.

## 2024-08-29 ENCOUNTER — APPOINTMENT (OUTPATIENT)
Facility: HOSPITAL | Age: 69
DRG: 951 | End: 2024-08-29
Attending: FAMILY MEDICINE
Payer: COMMERCIAL

## 2024-08-29 PROCEDURE — 6370000000 HC RX 637 (ALT 250 FOR IP): Performed by: FAMILY MEDICINE

## 2024-08-29 PROCEDURE — 71046 X-RAY EXAM CHEST 2 VIEWS: CPT

## 2024-08-29 PROCEDURE — 2580000003 HC RX 258: Performed by: FAMILY MEDICINE

## 2024-08-29 PROCEDURE — 1100000000 HC RM PRIVATE

## 2024-08-29 RX ADMIN — SODIUM CHLORIDE, PRESERVATIVE FREE 10 ML: 5 INJECTION INTRAVENOUS at 10:03

## 2024-08-29 RX ADMIN — PANTOPRAZOLE SODIUM 40 MG: 40 TABLET, DELAYED RELEASE ORAL at 07:42

## 2024-08-29 RX ADMIN — SODIUM CHLORIDE, PRESERVATIVE FREE 10 ML: 5 INJECTION INTRAVENOUS at 21:20

## 2024-08-29 RX ADMIN — MORPHINE SULFATE 30 MG: 15 TABLET, FILM COATED, EXTENDED RELEASE ORAL at 21:20

## 2024-08-29 RX ADMIN — POLYETHYLENE GLYCOL 3350 17 G: 17 POWDER, FOR SOLUTION ORAL at 10:02

## 2024-08-29 RX ADMIN — MIRTAZAPINE 45 MG: 15 TABLET, FILM COATED ORAL at 21:19

## 2024-08-29 RX ADMIN — SERTRALINE 100 MG: 50 TABLET, FILM COATED ORAL at 10:02

## 2024-08-29 RX ADMIN — MORPHINE SULFATE 30 MG: 15 TABLET, FILM COATED, EXTENDED RELEASE ORAL at 10:02

## 2024-08-29 ASSESSMENT — PAIN SCALES - GENERAL: PAINLEVEL_OUTOF10: 0

## 2024-08-29 NOTE — PLAN OF CARE
Problem: Safety - Adult  Goal: Free from fall injury  8/29/2024 0824 by Mimi Ness RN  Outcome: Adequate for Discharge  8/29/2024 0132 by Kim Corrales LPN  Outcome: Progressing     Problem: Skin/Tissue Integrity  Goal: Absence of new skin breakdown  Description: 1.  Monitor for areas of redness and/or skin breakdown  2.  Assess vascular access sites hourly  3.  Every 4-6 hours minimum:  Change oxygen saturation probe site  4.  Every 4-6 hours:  If on nasal continuous positive airway pressure, respiratory therapy assess nares and determine need for appliance change or resting period.  Outcome: Adequate for Discharge

## 2024-08-29 NOTE — PROGRESS NOTES
Asleep most of shift,   easily aroused.  Attempted to get out of bed x2,  looking for bathroom.  No c/o pain. LUE in sling.

## 2024-08-30 PROCEDURE — 1100000000 HC RM PRIVATE

## 2024-08-30 PROCEDURE — 6370000000 HC RX 637 (ALT 250 FOR IP): Performed by: FAMILY MEDICINE

## 2024-08-30 PROCEDURE — 2580000003 HC RX 258: Performed by: FAMILY MEDICINE

## 2024-08-30 RX ADMIN — SODIUM CHLORIDE, PRESERVATIVE FREE 10 ML: 5 INJECTION INTRAVENOUS at 12:06

## 2024-08-30 RX ADMIN — PROMETHAZINE HYDROCHLORIDE 12.5 MG: 25 TABLET ORAL at 12:06

## 2024-08-30 RX ADMIN — POLYETHYLENE GLYCOL 3350 17 G: 17 POWDER, FOR SOLUTION ORAL at 11:46

## 2024-08-30 RX ADMIN — MIRTAZAPINE 45 MG: 15 TABLET, FILM COATED ORAL at 20:31

## 2024-08-30 RX ADMIN — MORPHINE SULFATE 30 MG: 15 TABLET, FILM COATED, EXTENDED RELEASE ORAL at 11:45

## 2024-08-30 RX ADMIN — SERTRALINE 100 MG: 50 TABLET, FILM COATED ORAL at 11:46

## 2024-08-30 RX ADMIN — MORPHINE SULFATE 30 MG: 15 TABLET, FILM COATED, EXTENDED RELEASE ORAL at 20:31

## 2024-08-30 RX ADMIN — PANTOPRAZOLE SODIUM 40 MG: 40 TABLET, DELAYED RELEASE ORAL at 11:47

## 2024-08-30 ASSESSMENT — PAIN SCALES - GENERAL
PAINLEVEL_OUTOF10: 0
PAINLEVEL_OUTOF10: 2
PAINLEVEL_OUTOF10: 7

## 2024-08-30 ASSESSMENT — PAIN DESCRIPTION - LOCATION
LOCATION: ABDOMEN
LOCATION: ARM

## 2024-08-30 ASSESSMENT — PAIN DESCRIPTION - DESCRIPTORS
DESCRIPTORS: ACHING
DESCRIPTORS: SORE

## 2024-08-30 ASSESSMENT — PAIN DESCRIPTION - ORIENTATION
ORIENTATION: ANTERIOR;RIGHT
ORIENTATION: LEFT

## 2024-08-30 NOTE — PROGRESS NOTES
Patient has been comfortable this shift - she slept until 1135 this am- she has been changing her position in bed her self- she ate poorly all three meals- sips of water all shift.

## 2024-08-30 NOTE — PLAN OF CARE
Problem: Safety - Adult  Goal: Free from fall injury  8/30/2024 1953 by Gretta Atwood, RN  Outcome: Progressing  8/30/2024 1341 by Senia Abraham, RN  Outcome: Progressing     Problem: Skin/Tissue Integrity  Goal: Absence of new skin breakdown  Description: 1.  Monitor for areas of redness and/or skin breakdown  2.  Assess vascular access sites hourly  3.  Every 4-6 hours minimum:  Change oxygen saturation probe site  4.  Every 4-6 hours:  If on nasal continuous positive airway pressure, respiratory therapy assess nares and determine need for appliance change or resting period.  8/30/2024 1953 by Gretta Atwood, RN  Outcome: Progressing  8/30/2024 1341 by Senia Abraham, RN  Outcome: Progressing

## 2024-08-30 NOTE — PROGRESS NOTES
Magnolia  for at [facility], stated that patient will be D/C home Sunday sept.1st.  [facility] will pick her up at 3 pm.

## 2024-08-30 NOTE — PLAN OF CARE
Problem: Safety - Adult  Goal: Free from fall injury  8/29/2024 2049 by Kim Corrales LPN  Outcome: Progressing  8/29/2024 0824 by Mimi Ness RN  Outcome: Adequate for Discharge

## 2024-08-30 NOTE — CARE COORDINATION
Pt's daughter is wanting some home health education. Needing someone to go to their home and show how to transfer Mr Kathleen in and out of bed with the transfer board.    Please advise.   
RUR: 13%  Readmission? Yes - planned readmit for hospice respite care  IM? N/A   ? N/A     Plan: Discharge home with continuing hospice services.     Discharge date: 9/1/24  Transportation: Scheduled for 1500 through [employer] (scheduled through [facility])    [facility] Social Worker:   Magnolia Bhardwaj  Phone: [phone]   Email: [email or web address]    CM received an email from pt's [facility] Social Worker, Magnolia Bhardwaj, with an update regarding pt's discharge plan. The [facility] does not have a hospice bed available for this patient on 9/1/23, so the patient will discharge home with continued hospice services through [facility]. Magnolia is [facility] , Jailene Hurley, [phone], [email or web address], to coordinate a longer-term plan for pt (skilled nursing placement through the [facility]).    Jailene Hurley requesting a copy of the patient's chest x-ray for future skilled nursing placement. CM spoke with Magnolia Bhardwaj via phone, who confirmed that the chest x-ray was received yesterday, 8/29.    Pt clear from a case management perspective.    Tamar Salas LMSW,   [phone]  
their medications? No   In our efforts to provide the best possible care to you and others like you, can you think of anything that we could have done to help you after you left the hospital the first time, so that you might not have needed to return so soon? Other (Comment)  (N/A)     Tamar Salas [facility],   [phone]

## 2024-08-31 PROCEDURE — 6370000000 HC RX 637 (ALT 250 FOR IP): Performed by: FAMILY MEDICINE

## 2024-08-31 PROCEDURE — 1100000000 HC RM PRIVATE

## 2024-08-31 RX ORDER — MORPHINE SULFATE 30 MG/1
30 TABLET, FILM COATED, EXTENDED RELEASE ORAL 2 TIMES DAILY
COMMUNITY

## 2024-08-31 RX ORDER — MIRTAZAPINE 45 MG/1
45 TABLET, FILM COATED ORAL NIGHTLY
Qty: 30 TABLET | Refills: 3 | Status: CANCELLED | OUTPATIENT
Start: 2024-08-31

## 2024-08-31 RX ORDER — OXYCODONE HYDROCHLORIDE 5 MG/1
10 TABLET ORAL EVERY 4 HOURS PRN
COMMUNITY

## 2024-08-31 RX ORDER — SENNOSIDES A AND B 8.6 MG/1
1 TABLET, FILM COATED ORAL 2 TIMES DAILY PRN
COMMUNITY

## 2024-08-31 RX ORDER — ACETAMINOPHEN 500 MG
500 TABLET ORAL 2 TIMES DAILY PRN
COMMUNITY

## 2024-08-31 RX ORDER — MIRTAZAPINE 30 MG/1
45 TABLET, ORALLY DISINTEGRATING ORAL NIGHTLY
COMMUNITY

## 2024-08-31 RX ORDER — PROCHLORPERAZINE MALEATE 10 MG
10 TABLET ORAL EVERY 6 HOURS PRN
COMMUNITY

## 2024-08-31 RX ORDER — LIDOCAINE 4 G/G
1 PATCH TOPICAL DAILY
COMMUNITY

## 2024-08-31 RX ADMIN — POLYETHYLENE GLYCOL 3350 17 G: 17 POWDER, FOR SOLUTION ORAL at 09:30

## 2024-08-31 RX ADMIN — SERTRALINE 100 MG: 50 TABLET, FILM COATED ORAL at 09:30

## 2024-08-31 RX ADMIN — MIRTAZAPINE 45 MG: 15 TABLET, FILM COATED ORAL at 20:26

## 2024-08-31 RX ADMIN — MORPHINE SULFATE 30 MG: 15 TABLET, FILM COATED, EXTENDED RELEASE ORAL at 09:30

## 2024-08-31 RX ADMIN — PANTOPRAZOLE SODIUM 40 MG: 40 TABLET, DELAYED RELEASE ORAL at 09:29

## 2024-08-31 RX ADMIN — MORPHINE SULFATE 30 MG: 15 TABLET, FILM COATED, EXTENDED RELEASE ORAL at 20:26

## 2024-08-31 ASSESSMENT — PAIN DESCRIPTION - ORIENTATION: ORIENTATION: LEFT

## 2024-08-31 ASSESSMENT — PAIN DESCRIPTION - DESCRIPTORS: DESCRIPTORS: ACHING

## 2024-08-31 ASSESSMENT — PAIN DESCRIPTION - LOCATION: LOCATION: ARM

## 2024-08-31 ASSESSMENT — PAIN SCALES - GENERAL
PAINLEVEL_OUTOF10: 5
PAINLEVEL_OUTOF10: 0

## 2024-08-31 NOTE — PROGRESS NOTES
Patient eaten a little more for lunch today than previously, states she does not have much of an appetite and is more thirsty at times than hungry. Also is more awake and talkative today, very pleasant and appreciative.

## 2024-08-31 NOTE — PROGRESS NOTES
Patient rest quietly,eyes closed,respiration even and unlabored during the shift. No complaints of any discomfort. Morning care done. Position for comfort.

## 2024-08-31 NOTE — PROGRESS NOTES
Spoke with son, Rinku Crow via phone, clarified that patient will be discharged today and [facility] has set up transport to pick patient up at 1600.

## 2024-08-31 NOTE — PLAN OF CARE
Problem: Safety - Adult  Goal: Free from fall injury  8/31/2024 0704 by Mimi Ness RN  Outcome: Adequate for Discharge  8/30/2024 1953 by Gretta Atwood, RN  Outcome: Progressing     Problem: Skin/Tissue Integrity  Goal: Absence of new skin breakdown  Description: 1.  Monitor for areas of redness and/or skin breakdown  2.  Assess vascular access sites hourly  3.  Every 4-6 hours minimum:  Change oxygen saturation probe site  4.  Every 4-6 hours:  If on nasal continuous positive airway pressure, respiratory therapy assess nares and determine need for appliance change or resting period.  8/31/2024 0704 by Mimi Ness, RN  Outcome: Adequate for Discharge  8/30/2024 1953 by Gretta Atwood, RN  Outcome: Progressing

## 2024-08-31 NOTE — PLAN OF CARE
Problem: Safety - Adult  Goal: Free from fall injury  8/31/2024 1914 by Gretta Atwood RN  Outcome: Adequate for Discharge  8/31/2024 0704 by Mimi Ness RN  Outcome: Adequate for Discharge     Problem: Skin/Tissue Integrity  Goal: Absence of new skin breakdown  Description: 1.  Monitor for areas of redness and/or skin breakdown  2.  Assess vascular access sites hourly  3.  Every 4-6 hours minimum:  Change oxygen saturation probe site  4.  Every 4-6 hours:  If on nasal continuous positive airway pressure, respiratory therapy assess nares and determine need for appliance change or resting period.  8/31/2024 1914 by Gretta Atwood RN  Outcome: Adequate for Discharge  8/31/2024 0704 by Mimi Ness RN  Outcome: Adequate for Discharge     Problem: Skin/Tissue Integrity - Adult  Goal: Skin integrity remains intact  Outcome: Progressing  Goal: Incisions, wounds, or drain sites healing without S/S of infection  Outcome: Progressing  Goal: Oral mucous membranes remain intact  Outcome: Progressing

## 2024-08-31 NOTE — PROGRESS NOTES
Reached out to [facility] for an ETA of transport as they were to arrive at 1600 per son, Rinku Crow. Zeenat, on call nurse for [facility] called back and stated that  is for 9-1 at 1500, not today. Reached back out to Rinku Crow to make him aware of the date and time of  through [facility]. He states that he called the transport company on Friday 8-30, the company told him  was on 9-1 at 1500 but they would be in the area today, 8-31 and could  at 1600 and he agreed. Relayed this information to Zeenat. Discharge updated to recommended date and time per [facility] for 9-1,  at 1500 as originally planned. MD and family aware.

## 2024-08-31 NOTE — DISCHARGE SUMMARY
Physician Discharge Summary       Patient: Madyson Rhodse MRN: [id number]     YOB: 1955  Age: 69 y.o.  Sex: female    PCP: Sharad Jovel MD    Allergies: Sulfa antibiotics and Sulfasalazine    Admit date: 8/27/2024  Admitting Provider: Ivana Lara MD    Discharge date: 8/31/2024  Discharging Provider: Ivana Lara MD    * Admission Diagnoses:   History of malignant neoplasm metastatic to bone [Z85.89]      * Discharge Diagnoses:    Active Hospital Problems    History of malignant neoplasm metastatic to bone         No chief complaint on file.  Hospice respite care    Presentation on 8/27/2024  HPI as per admitting provider on 8/27/2024      Chief Complaints:   No chief complaint on file.  Hospice Respite stay       Subjective:   Madyson Rhodes is a 69 y.o. female followed by Sharad Jovel MD and  has a past medical history of Anxiety, Bronchitis, Bursitis of shoulder, left, Cancer (HCC), Cannabis abuse, Cellulitis of face, Depression, Dizziness and giddiness, Esophageal reflux, Fatigue, Fecal occult blood test positive, GERD (gastroesophageal reflux disease), Headache, HTN (hypertension), Obesity, Osteoarthritis, Rheumatoid arthritis (HCC), Rotator cuff syndrome, and Tobacco use.       Patient seen by me today around 9:50am, was sleeping comfortably and woke to voice.  At this time she is here for a respite hospital stay.  Patient told me she is feeling fairly comfortable and has been able to rest and sleep. Pain in L arm is 6/10 down from 10/10.  She denies abd pain, nausea, or feeling constipated. Denies feeling SOB.      Patient is asking to go home but is confused, and unfortunately going home is probably not a long term option.  I spoke with the patient's home hospice nurse today who notes family is having a lot of trouble taking care of her at home and patient has been \"noncompliant.\" At this point the goal is comfort anyway.     Plan is to

## 2024-08-31 NOTE — PROGRESS NOTES
Patient resting quietly in bed with eyes closed. No acute distress noted, video monitor in use, fall measures in place including bed alarm.

## 2024-09-01 VITALS
HEART RATE: 92 BPM | RESPIRATION RATE: 16 BRPM | DIASTOLIC BLOOD PRESSURE: 69 MMHG | SYSTOLIC BLOOD PRESSURE: 126 MMHG | OXYGEN SATURATION: 100 % | TEMPERATURE: 97 F

## 2024-09-01 PROCEDURE — 6370000000 HC RX 637 (ALT 250 FOR IP): Performed by: FAMILY MEDICINE

## 2024-09-01 RX ADMIN — PANTOPRAZOLE SODIUM 40 MG: 40 TABLET, DELAYED RELEASE ORAL at 07:00

## 2024-09-01 RX ADMIN — MORPHINE SULFATE 30 MG: 15 TABLET, FILM COATED, EXTENDED RELEASE ORAL at 08:45

## 2024-09-01 RX ADMIN — SERTRALINE 100 MG: 50 TABLET, FILM COATED ORAL at 08:45

## 2024-09-01 RX ADMIN — POLYETHYLENE GLYCOL 3350 17 G: 17 POWDER, FOR SOLUTION ORAL at 08:45

## 2024-09-01 ASSESSMENT — PAIN SCALES - GENERAL: PAINLEVEL_OUTOF10: 0

## 2024-09-01 NOTE — DISCHARGE SUMMARY
Patient seen by me briefly this morning again.  Discharge process was initially completed yesterday but there was a mixup on the planned date of discharge from the respite stay.  Patient will be discharged officially today.  Discussed with nursing and case management, nursing has spoken to the hospice staff.  Patient fairly comfortable, awake this morning, eating a bit.  No new complaints.  To be home with home hospice until a bed opens in the [facility] hospice facility.  No changes from the discharge plan documented yesterday.  Hospice care, comfort meds.  Today 9/1/2024 is the actual date of discharge.

## 2024-09-01 NOTE — PLAN OF CARE
Problem: Safety - Adult  Goal: Free from fall injury  9/1/2024 0742 by Mimi Ness RN  Outcome: Adequate for Discharge  8/31/2024 1914 by Gretta Atwood RN  Outcome: Adequate for Discharge     Problem: Skin/Tissue Integrity  Goal: Absence of new skin breakdown  Description: 1.  Monitor for areas of redness and/or skin breakdown  2.  Assess vascular access sites hourly  3.  Every 4-6 hours minimum:  Change oxygen saturation probe site  4.  Every 4-6 hours:  If on nasal continuous positive airway pressure, respiratory therapy assess nares and determine need for appliance change or resting period.  9/1/2024 0742 by Mimi Ness RN  Outcome: Adequate for Discharge  8/31/2024 1914 by Gretta Atwood RN  Outcome: Adequate for Discharge     Problem: Skin/Tissue Integrity - Adult  Goal: Skin integrity remains intact  9/1/2024 0742 by Mimi Ness RN  Outcome: Adequate for Discharge  8/31/2024 1914 by Gretta Atwood RN  Outcome: Progressing  Goal: Incisions, wounds, or drain sites healing without S/S of infection  8/31/2024 1914 by Gretta Atwood RN  Outcome: Progressing  Goal: Oral mucous membranes remain intact  8/31/2024 1914 by Gretta Atwood RN  Outcome: Progressing

## (undated) DEVICE — TUBING, SUCTION, 9/32" X 10', STRAIGHT: Brand: MEDLINE

## (undated) DEVICE — PAD,PREPPING,CUFFED,24X48,7",NONSTERILE: Brand: MEDLINE

## (undated) DEVICE — FCPS RAD JAW 4 SC 240CM W/NDL --

## (undated) DEVICE — STERILE POLYISOPRENE POWDER-FREE SURGICAL GLOVES: Brand: PROTEXIS

## (undated) DEVICE — SOL IRR STRL H2O 1000ML BTL --

## (undated) DEVICE — SPONGE GZ W4XL4IN COT 12 PLY TYP VII WVN C FLD DSGN

## (undated) DEVICE — 1200CC GUARDIAN II: Brand: GUARDIAN

## (undated) DEVICE — JELLY,LUBE,STERILE,FLIP TOP,TUBE,4-OZ: Brand: MEDLINE

## (undated) DEVICE — WASH CLOTH INCONT LO LINT PREM 12X13 IN LF DISP